# Patient Record
Sex: FEMALE | Race: WHITE | NOT HISPANIC OR LATINO | Employment: FULL TIME | URBAN - METROPOLITAN AREA
[De-identification: names, ages, dates, MRNs, and addresses within clinical notes are randomized per-mention and may not be internally consistent; named-entity substitution may affect disease eponyms.]

---

## 2017-03-19 ENCOUNTER — HOSPITAL ENCOUNTER (EMERGENCY)
Facility: OTHER | Age: 27
Discharge: HOME | End: 2017-03-19
Attending: EMERGENCY MEDICINE
Payer: MEDICAID

## 2017-03-19 VITALS
HEIGHT: 65 IN | HEART RATE: 95 BPM | BODY MASS INDEX: 22.49 KG/M2 | OXYGEN SATURATION: 100 % | WEIGHT: 135 LBS | SYSTOLIC BLOOD PRESSURE: 177 MMHG | TEMPERATURE: 98 F | RESPIRATION RATE: 20 BRPM | DIASTOLIC BLOOD PRESSURE: 120 MMHG

## 2017-03-19 DIAGNOSIS — M43.6 TORTICOLLIS: Primary | ICD-10-CM

## 2017-03-19 PROCEDURE — 99283 EMERGENCY DEPT VISIT LOW MDM: CPT

## 2017-03-19 RX ORDER — INDOMETHACIN 50 MG/1
50 CAPSULE ORAL
Qty: 15 CAPSULE | Refills: 0 | Status: SHIPPED | OUTPATIENT
Start: 2017-03-19 | End: 2017-05-12

## 2017-03-19 RX ORDER — LEVOTHYROXINE SODIUM 100 UG/1
100 TABLET ORAL DAILY
COMMUNITY

## 2017-03-19 RX ORDER — IBUPROFEN 200 MG
400 TABLET ORAL EVERY 6 HOURS PRN
Qty: 30 TABLET | Refills: 0
Start: 2017-03-19 | End: 2020-03-15

## 2017-03-19 RX ORDER — LEVONORGESTREL / ETHINYL ESTRADIOL AND ETHINYL ESTRADIOL 150-30(84)
KIT ORAL
COMMUNITY

## 2017-03-19 RX ORDER — METHOCARBAMOL 750 MG/1
1500 TABLET, FILM COATED ORAL 3 TIMES DAILY
Qty: 30 TABLET | Refills: 0 | Status: SHIPPED | OUTPATIENT
Start: 2017-03-19 | End: 2017-03-29

## 2017-03-19 RX ORDER — ACETAMINOPHEN 500 MG
1000 TABLET ORAL 3 TIMES DAILY PRN
Qty: 30 TABLET | Refills: 0
Start: 2017-03-19 | End: 2020-03-15

## 2017-03-19 NOTE — ED AVS SNAPSHOT
[facility] EMERGENCY DEPARTMENT  [address] [place] [address]               Kaylee Lefort   3/19/2017 10:19 PM   ED    Description:  Female : 1990   Department:  [facility] Emergency Department           Your Care was Coordinated By:     Provider Role From To    Andrea Ovalle MD Attending Provider 17 [phone] --      Reason for Visit     Neck Pain           Diagnoses this Visit        Comments    Torticollis    -  Primary       ED Disposition     ED Disposition Condition Comment    Discharge             To Do List           Follow-up Information     Follow up with [phone]. Schedule an appointment as soon as possible for a visit in 3 day(s).       These Medications        Disp Refills Start End    methocarbamol (ROBAXIN) 750 MG Tab 30 tablet 0 3/19/2017 3/29/2017    Take 2 tablets (1,500 mg total) by mouth 3 (three) times daily. - Oral    ibuprofen (ADVIL,MOTRIN) 200 MG tablet 30 tablet 0 3/19/2017     Take 2 tablets (400 mg total) by mouth every 6 (six) hours as needed for Pain. - Oral    acetaminophen (TYLENOL) 500 MG tablet 30 tablet 0 3/19/2017     Take 2 tablets (1,000 mg total) by mouth 3 (three) times daily as needed for Pain. - Oral    indomethacin (INDOCIN) 50 MG capsule 15 capsule 0 3/19/2017     Take 1 capsule (50 mg total) by mouth 3 (three) times daily with meals. - Oral      [facility]     [facility] -  Assistance  Registered nurses in the [facility] provide clinical advisement, health education, appointment booking, and other advisory services.  Call for this free service at [phone].             Medications           Message regarding Medications     Verify the changes and/or additions to your medication regime listed below are the same as discussed with your clinician today.  If any of these changes or additions are incorrect, please notify your healthcare provider.        START taking these NEW medications  "       Refills    methocarbamol (ROBAXIN) 750 MG Tab 0    Sig: Take 2 tablets (1,500 mg total) by mouth 3 (three) times daily.    Class: Print    Route: Oral    ibuprofen (ADVIL,MOTRIN) 200 MG tablet 0    Sig: Take 2 tablets (400 mg total) by mouth every 6 (six) hours as needed for Pain.    Class: No Print    Route: Oral    acetaminophen (TYLENOL) 500 MG tablet 0    Sig: Take 2 tablets (1,000 mg total) by mouth 3 (three) times daily as needed for Pain.    Class: No Print    Route: Oral    indomethacin (INDOCIN) 50 MG capsule 0    Sig: Take 1 capsule (50 mg total) by mouth 3 (three) times daily with meals.    Class: Print    Route: Oral           Verify that the below list of medications is an accurate representation of the medications you are currently taking.  If none reported, the list may be blank. If incorrect, please contact your healthcare provider. Carry this list with you in case of emergency.           Current Medications     L norgest/e.estradiol-e.estrad (AMETHIA) 0.15 mg-30 mcg (84)/10 mcg (7) 3MPk Take by mouth.    levothyroxine (SYNTHROID) 100 MCG tablet Take 100 mcg by mouth once daily.    acetaminophen (TYLENOL) 500 MG tablet Take 2 tablets (1,000 mg total) by mouth 3 (three) times daily as needed for Pain.    ibuprofen (ADVIL,MOTRIN) 200 MG tablet Take 2 tablets (400 mg total) by mouth every 6 (six) hours as needed for Pain.    indomethacin (INDOCIN) 50 MG capsule Take 1 capsule (50 mg total) by mouth 3 (three) times daily with meals.    methocarbamol (ROBAXIN) 750 MG Tab Take 2 tablets (1,500 mg total) by mouth 3 (three) times daily.           Clinical Reference Information           Your Vitals Were     BP Pulse Temp Resp Height Weight    177/120 (BP Location: Left arm, Patient Position: Sitting) 95 97.9 °F (36.6 °C) (Temporal) 20 5' 5" (1.651 m) 61.2 kg (135 lb)    SpO2 BMI             100% 22.47 kg/m2         Allergies as of 3/19/2017     No Known Allergies      Immunizations Administered on " Date of Encounter - 3/19/2017     None      ED Micro, Lab, POCT     None      ED Imaging Orders     None        Discharge Instructions           Torticollis (Wry Neck)  Torticollis happens when muscles on one side of the neck contract (tighten). This causes the neck to twist or tilt to the side. The muscles may also be quite sore. It affects mainly children and young adults, often appearing overnight. It can also affect infants who develop or are born with tight neck muscles on one side.  What causes torticollis?  Causes of torticollis include:  · Congenital (present at birth). Injury to the neck muscles from an accident or other trauma, or even just sleeping in an unusual position  · Side effect of certain medicines or drugs  · Problems with the bones of the neck (which can happen after an infection or injury)  · Spasm of the muscles due to an infection, such as an abscess in the neck  When to go to the emergency room (ER)  All neck problems should be checked by a healthcare provider within 24 hours. Seek emergency care if you can't reach your healthcare provider or these symptoms are present:  · Trouble breathing or swallowing or in smaller children, continuous drooling  · Numbness or weakness in the arms and legs  · Trouble walking or speaking  · Fever  What to expect in the ER  The neck will be examined, and questions about any current or former medical problems will be asked. X-rays of the neck may be taken to check for broken bones.  Treatment  The goal in treating torticollis is to relax the neck muscles. The best approach will depend on the cause of the problem. In most cases, one or more of the following may be given:  · Medicines to help relax the muscles and reduce swelling  · Hot and cold compresses to help ease muscle tightness  · Botulinum toxin injections to prevent further muscle spasms  · Physical therapy to help stretch and relax the muscles  · Treatment of any infection, which may need intravenous  antibiotics or surgery  Follow-up  Depending on the cause, torticollis often goes away on its own. Follow up with your healthcare provider as instructed. If symptoms become worse, call your healthcare provider or return to the ER.  Date Last Reviewed: 9/30/2015  © 9292-2316 [employer]. [address] [place] [address]. All rights reserved. This information is not intended as a substitute for professional medical care. Always follow your healthcare professional's instructions.          [facility] Sign-Up     Activating your [facility] account is as easy as 1-2-3!     1) Visit [email or web address], select Sign Up Now, enter this activation code and your date of birth, then select Next.  [email or web address]  Expires: 5/3/2017 10:39 PM      2) Create a username and password to use when you visit [facility] in the future and select a security question in case you lose your password and select Next.    3) Enter your e-mail address and click Sign Up!    Additional Information  If you have questions, please e-mail [email or web address] or call [phone] to talk to our [facility] staff. Remember, [facility] is NOT to be used for urgent needs. For medical emergencies, dial 911.          [facility] complies with applicable Federal civil rights laws and does not discriminate on the basis of race, color, national origin, age, disability, or sex.        Language Assistance Services     ATTENTION: Language assistance services are available, free of charge. Please call [phone].      ATENCIÓN: Si habla español, tiene a chaparro disposición servicios gratuitos de asistencia lingüística. Llame al [phone].     CHÚ Ý: N?u b?n nói Ti?ng Vi?t, có các d?ch v? h? tr? ngôn ng? mi?n phí dành cho b?n. G?i s? [phone].

## 2017-03-20 NOTE — ED PROVIDER NOTES
Encounter Date: 3/19/2017       History     Chief Complaint   Patient presents with    Neck Pain     pt presents to ER with c/o rt sided neck pain and stiffness.      Review of patient's allergies indicates:  No Known Allergies  Patient is a 26 y.o. female presenting with the following complaint: neck pain. The history is provided by the patient.   Neck Pain    This is a new problem. The current episode started today. The problem has been unchanged. The pain is associated with nothing. The pain is present in the occipital region (Pinpoint pain and spasm on the right-hand side). The pain is at a severity of 5/10. The symptoms are aggravated by twisting. Pertinent negatives include no photophobia, no visual change, no paresis and no weakness. She has tried NSAIDs and heat for the symptoms.     Past Medical History:   Diagnosis Date    Thyroid disease      Past Surgical History:   Procedure Laterality Date    BACK SURGERY       History reviewed. No pertinent family history.  Social History   Substance Use Topics    Smoking status: Never Smoker    Smokeless tobacco: None    Alcohol use No     Review of Systems   Constitutional: Negative.    HENT: Negative.    Eyes: Negative.  Negative for photophobia.   Respiratory: Negative.    Cardiovascular: Negative.    Gastrointestinal: Negative.    Endocrine: Negative.    Genitourinary: Negative.    Musculoskeletal: Positive for neck pain.   Skin: Negative.    Allergic/Immunologic: Negative.    Neurological: Negative.  Negative for weakness.   Hematological: Negative.    Psychiatric/Behavioral: Negative.    All other systems reviewed and are negative.      Physical Exam   Initial Vitals   BP Pulse Resp Temp SpO2   03/19/17 2216 03/19/17 2216 03/19/17 2216 03/19/17 2216 03/19/17 2216   177/120 95 20 97.9 °F (36.6 °C) 100 %     Physical Exam    Nursing note and vitals reviewed.  Constitutional: Vital signs are normal. She appears well-developed. She is active and cooperative.    HENT:   Head: Normocephalic and atraumatic.   Eyes: Conjunctivae, EOM and lids are normal. Pupils are equal, round, and reactive to light.   Neck: Trachea normal and full passive range of motion without pain. Neck supple. No thyroid mass present. Muscular tenderness present. No spinous process tenderness present. No rigidity. No Brudzinski's sign and no Kernig's sign noted.       Cardiovascular: Normal rate, regular rhythm, S1 normal, S2 normal, normal heart sounds, intact distal pulses and normal pulses.   Abdominal: Soft. Normal appearance, normal aorta and bowel sounds are normal.   Musculoskeletal: Normal range of motion.   Lymphadenopathy:     She has no axillary adenopathy.   Neurological: She is alert and oriented to person, place, and time.   Skin: Skin is warm, dry and intact.   Psychiatric: She has a normal mood and affect. Her speech is normal and behavior is normal. Judgment and thought content normal. Cognition and memory are normal.         ED Course   Procedures  Labs Reviewed - No data to display                            ED Course     Clinical Impression:   The encounter diagnosis was Torticollis.          Andrea Ovalle MD  03/19/17 [id number]

## 2017-03-20 NOTE — ED TRIAGE NOTES
Pt presents to ER with c/o neck stiffness to rt side of neck that started yesterday,  Has been taking tylenol without relief.

## 2017-03-20 NOTE — DISCHARGE INSTRUCTIONS
Torticollis (Wry Neck)  Torticollis happens when muscles on one side of the neck contract (tighten). This causes the neck to twist or tilt to the side. The muscles may also be quite sore. It affects mainly children and young adults, often appearing overnight. It can also affect infants who develop or are born with tight neck muscles on one side.  What causes torticollis?  Causes of torticollis include:  · Congenital (present at birth). Injury to the neck muscles from an accident or other trauma, or even just sleeping in an unusual position  · Side effect of certain medicines or drugs  · Problems with the bones of the neck (which can happen after an infection or injury)  · Spasm of the muscles due to an infection, such as an abscess in the neck  When to go to the emergency room (ER)  All neck problems should be checked by a healthcare provider within 24 hours. Seek emergency care if you can't reach your healthcare provider or these symptoms are present:  · Trouble breathing or swallowing or in smaller children, continuous drooling  · Numbness or weakness in the arms and legs  · Trouble walking or speaking  · Fever  What to expect in the ER  The neck will be examined, and questions about any current or former medical problems will be asked. X-rays of the neck may be taken to check for broken bones.  Treatment  The goal in treating torticollis is to relax the neck muscles. The best approach will depend on the cause of the problem. In most cases, one or more of the following may be given:  · Medicines to help relax the muscles and reduce swelling  · Hot and cold compresses to help ease muscle tightness  · Botulinum toxin injections to prevent further muscle spasms  · Physical therapy to help stretch and relax the muscles  · Treatment of any infection, which may need intravenous antibiotics or surgery  Follow-up  Depending on the cause, torticollis often goes away on its own. Follow up with your healthcare provider  as instructed. If symptoms become worse, call your healthcare provider or return to the ER.  Date Last Reviewed: 9/30/2015  © 0871-9668 [employer]. [address] [place] [address], [place] [address]. All rights reserved. This information is not intended as a substitute for professional medical care. Always follow your healthcare professional's instructions.

## 2017-05-09 ENCOUNTER — HOSPITAL ENCOUNTER (EMERGENCY)
Facility: OTHER | Age: 27
Discharge: HOME | End: 2017-05-09
Attending: EMERGENCY MEDICINE
Payer: MEDICAID

## 2017-05-09 VITALS
HEART RATE: 98 BPM | WEIGHT: 130 LBS | BODY MASS INDEX: 21.63 KG/M2 | OXYGEN SATURATION: 99 % | TEMPERATURE: 98 F | SYSTOLIC BLOOD PRESSURE: 152 MMHG | RESPIRATION RATE: 18 BRPM | DIASTOLIC BLOOD PRESSURE: 94 MMHG

## 2017-05-09 DIAGNOSIS — L03.90 CELLULITIS, UNSPECIFIED CELLULITIS SITE: Primary | ICD-10-CM

## 2017-05-09 PROCEDURE — 96372 THER/PROPH/DIAG INJ SC/IM: CPT

## 2017-05-09 PROCEDURE — 25000003 PHARM REV CODE 250: Performed by: EMERGENCY MEDICINE

## 2017-05-09 PROCEDURE — 99283 EMERGENCY DEPT VISIT LOW MDM: CPT

## 2017-05-09 RX ORDER — LORATADINE 10 MG/1
10 TABLET ORAL DAILY
Qty: 60 TABLET | Refills: 0 | Status: SHIPPED | OUTPATIENT
Start: 2017-05-09 | End: 2018-05-09

## 2017-05-09 RX ORDER — AMLODIPINE BESYLATE 5 MG/1
5 TABLET ORAL DAILY
COMMUNITY

## 2017-05-09 RX ORDER — CLINDAMYCIN PHOSPHATE 150 MG/ML
600 INJECTION, SOLUTION INTRAVENOUS
Status: COMPLETED | OUTPATIENT
Start: 2017-05-09 | End: 2017-05-09

## 2017-05-09 RX ORDER — DIPHENHYDRAMINE HCL 25 MG
25 CAPSULE ORAL EVERY 6 HOURS PRN
Qty: 30 CAPSULE | Refills: 0 | Status: SHIPPED | OUTPATIENT
Start: 2017-05-09 | End: 2017-05-17

## 2017-05-09 RX ORDER — CLINDAMYCIN HYDROCHLORIDE 150 MG/1
300 CAPSULE ORAL 4 TIMES DAILY
Qty: 56 CAPSULE | Refills: 0 | Status: SHIPPED | OUTPATIENT
Start: 2017-05-09 | End: 2017-05-17

## 2017-05-09 RX ORDER — NAPROXEN 375 MG/1
375 TABLET ORAL 2 TIMES DAILY PRN
Qty: 20 TABLET | Refills: 0 | Status: SHIPPED | OUTPATIENT
Start: 2017-05-09 | End: 2017-05-12

## 2017-05-09 RX ADMIN — CLINDAMYCIN PHOSPHATE 600 MG: 150 INJECTION, SOLUTION INTRAMUSCULAR; INTRAVENOUS at 07:05

## 2017-05-10 NOTE — ED PROVIDER NOTES
Encounter Date: 5/9/2017       History     Chief Complaint   Patient presents with    Ankle Pain     Pt c/o of l ankle swelling x 3 days. Pt denies trauma. Swelling and redness noted.      Review of patient's allergies indicates:  No Known Allergies  HPI Comments: Kaylee Lefort is a 26 y.o. female who presents to the Emergency Department with  redness pain and swelling to bilateral lower extremity.  Patient states 3 days ago she was bit by mosquitoes and then noticed red painful bumps which is gotten slowly worse over last few days now she has swelling around the red spots on her lower legs.  Patient denies any injury.  Patient's been trying steroid cream but has not helped.  Patient's taken nothing for pain.    Patient is a 26 y.o. female presenting with the following complaint: rash. The history is provided by the patient.   Rash    This is a new problem. The current episode started several days ago. The problem has been gradually worsening. The problem is associated with an insect bite/sting. Affected Location: Left lower leg and ankle and Right lower leg. The pain is at a severity of 3/10. The pain has been intermittent since onset. Associated symptoms include itching and pain. Pertinent negatives include no blisters and no weeping. She has tried anti-itch cream for the symptoms. The treatment provided mild relief.     Past Medical History:   Diagnosis Date    Hypertension     Thyroid disease      Past Surgical History:   Procedure Laterality Date    BACK SURGERY       History reviewed. No pertinent family history.  Social History   Substance Use Topics    Smoking status: Never Smoker    Smokeless tobacco: None    Alcohol use No     Review of Systems   Constitutional: Negative for fever.   HENT: Negative for sore throat.    Respiratory: Negative for shortness of breath.    Cardiovascular: Negative for chest pain.   Gastrointestinal: Negative for nausea.   Genitourinary: Negative for dysuria.    Musculoskeletal: Negative for back pain, gait problem and neck pain.   Skin: Positive for itching and rash.   Neurological: Negative for weakness.   Hematological: Does not bruise/bleed easily.   All other systems reviewed and are negative.      Physical Exam   Initial Vitals   BP Pulse Resp Temp SpO2   05/09/17 1826 05/09/17 1826 05/09/17 1826 05/09/17 1826 05/09/17 1826   152/94 98 18 98 °F (36.7 °C) 99 %     Physical Exam    Nursing note and vitals reviewed.  Constitutional: She appears well-developed and well-nourished. She is not diaphoretic. No distress.   HENT:   Head: Normocephalic and atraumatic.   Right Ear: External ear normal.   Left Ear: External ear normal.   Nose: Nose normal.   Mouth/Throat: Oropharynx is clear and moist.   Eyes: Conjunctivae and EOM are normal. Pupils are equal, round, and reactive to light.   Neck: Normal range of motion. Neck supple.   Cardiovascular: Normal rate, regular rhythm, normal heart sounds and intact distal pulses. Exam reveals no gallop and no friction rub.    No murmur heard.  Pulmonary/Chest: Breath sounds normal. No respiratory distress. She has no wheezes. She has no rhonchi. She has no rales.   Abdominal: Soft. There is no tenderness. There is no rebound and no guarding.   Musculoskeletal: Normal range of motion. She exhibits edema and tenderness.        Right hip: Normal.        Left hip: Normal.        Right knee: Normal.        Left knee: Normal.        Right ankle: She exhibits swelling. She exhibits normal range of motion, no deformity, no laceration and normal pulse. Tenderness.        Left ankle: She exhibits swelling. She exhibits normal range of motion, no deformity, no laceration and normal pulse. Tenderness.        Right lower leg: Normal.        Left lower leg: Normal.        Left foot: There is tenderness and swelling. There is normal range of motion, normal capillary refill, no crepitus and no laceration.        Feet:    Irregular patches of  erythema appreciated to bilateral lower extremities.  Tenderness in area of lesions.  Negative Nikolsky sign.  Please refer to photos   Neurological: She is alert and oriented to person, place, and time. She has normal strength and normal reflexes.   Skin: Skin is warm and dry. Petechiae and rash noted. No abscess noted. Rash is not vesicular and not urticarial. There is erythema.        Negative Nikolsky sign.  Areas of erythema with Irregular borders.  Please refer to photos   Psychiatric: She has a normal mood and affect.                         ED Course   Procedures  Labs Reviewed - No data to display                            ED Course     MDM  CC mosquito bites that are now painful and swollen.  DDx ALLERGIC reaction, contact dermatitis, and cellulitis  Treatment in the ED is occluded exam.  Patient is nontoxic in appearance.  Tenderness at sites of erythema.  Discussed prescriptions and need to follow-up in 2 days for wound check either with primary care or ED.  Also provided patient with a dermatology referral.  Advised patient to return  to the emergency symptoms worsen or do not resolve  Fill and take prescriptions as directed.  Answered questions and discussed discharge plan.    Follow up with PCP in 1-2 days.    Clinical Impression:   The encounter diagnosis was Cellulitis, unspecified cellulitis site.           Naila Toscano DO  05/09/17 1946

## 2017-05-10 NOTE — DISCHARGE INSTRUCTIONS
Cellulitis  Cellulitis is an infection of the deep layers of skin. A break in the skin, such as a cut or scratch, can let bacteria under the skin. If the bacteria get to deep layers of the skin, it can be serious. If not treated, cellulitis can get into the bloodstream and lymph nodes. The infection can then spread throughout the body. This causes serious illness.  Cellulitis causes the affected skin to become red, swollen, warm, and sore. The reddened areas have a visible border. An open sore may leak fluid (pus). You may have a fever, chills, and pain.  Cellulitis is treated with antibiotics taken for 7 to 10 days. An open sore may be cleaned and covered with cool wet gauze. Symptoms should get better 1 to 2 days after treatment is started. Make sure to take all the antibiotics for the full number of days until they are gone. Keep taking the medicine even if your symptoms go away.  Home care  Follow these tips:  · Limit the use of the part of your body with cellulitis.   · If the infection is on your leg, keep your leg raised while sitting. This will help to reduce swelling.  · Take all of the antibiotic medicine exactly as directed until it is gone. Do not miss any doses, especially during the first 7 days. Dont stop taking the medicine when your symptoms get better.  · Keep the affected area clean and dry.  · Wash your hands with soap and warm water before and after touching your skin. Anyone else who touches your skin should also wash his or her hands. Don't share towels.  Follow-up care  Follow up with your healthcare provider, or as advised. If your infection does not go away on the first antibiotic, your healthcare provider will prescribe a different one.  When to seek medical advice  Call your healthcare provider right away if any of these occur:  · Red areas that spread  · Swelling or pain that gets worse  · Fluid leaking from the skin (pus)  · Fever higher of 100.4º F (38.0º C) or higher after 2 days  on antibiotics  Date Last Reviewed: 9/1/2016  © 7668-2216 [employer]. [address] [place] [address], [place] [address]. All rights reserved. This information is not intended as a substitute for professional medical care. Always follow your healthcare professional's instructions.

## 2017-05-12 ENCOUNTER — HOSPITAL ENCOUNTER (EMERGENCY)
Facility: OTHER | Age: 27
Discharge: ANOTHER HOSPITAL | End: 2017-05-12
Attending: EMERGENCY MEDICINE
Payer: MEDICAID

## 2017-05-12 ENCOUNTER — HOSPITAL ENCOUNTER (EMERGENCY)
Facility: HOSPITAL | Age: 27
Discharge: HOME | End: 2017-05-12
Attending: EMERGENCY MEDICINE
Payer: MEDICAID

## 2017-05-12 VITALS
HEART RATE: 96 BPM | BODY MASS INDEX: 21.66 KG/M2 | DIASTOLIC BLOOD PRESSURE: 92 MMHG | TEMPERATURE: 99 F | RESPIRATION RATE: 18 BRPM | SYSTOLIC BLOOD PRESSURE: 156 MMHG | WEIGHT: 130 LBS | HEIGHT: 65 IN | OXYGEN SATURATION: 98 %

## 2017-05-12 VITALS
BODY MASS INDEX: 21.66 KG/M2 | HEIGHT: 65 IN | DIASTOLIC BLOOD PRESSURE: 74 MMHG | SYSTOLIC BLOOD PRESSURE: 118 MMHG | RESPIRATION RATE: 18 BRPM | HEART RATE: 76 BPM | OXYGEN SATURATION: 100 % | WEIGHT: 130 LBS | TEMPERATURE: 98 F

## 2017-05-12 DIAGNOSIS — R23.3 PETECHIAL RASH: Primary | ICD-10-CM

## 2017-05-12 DIAGNOSIS — M79.89 LEFT LEG SWELLING: ICD-10-CM

## 2017-05-12 DIAGNOSIS — M79.89 PAIN AND SWELLING OF LEFT LOWER LEG: Primary | ICD-10-CM

## 2017-05-12 DIAGNOSIS — M79.662 PAIN AND SWELLING OF LEFT LOWER LEG: Primary | ICD-10-CM

## 2017-05-12 DIAGNOSIS — L03.90 CELLULITIS, UNSPECIFIED CELLULITIS SITE: ICD-10-CM

## 2017-05-12 LAB
ALBUMIN SERPL-MCNC: 3.2 G/DL (ref 3.3–5.5)
ALP SERPL-CCNC: 49 U/L (ref 42–141)
APTT BLDCRRT: 27.8 SEC
B-HCG UR QL: NEGATIVE
BASOPHILS # BLD AUTO: 0.03 K/UL
BASOPHILS NFR BLD: 0.8 %
BILIRUB SERPL-MCNC: 0.5 MG/DL (ref 0.2–1.6)
BUN SERPL-MCNC: 11 MG/DL (ref 7–22)
CALCIUM SERPL-MCNC: 9.9 MG/DL (ref 8–10.3)
CHLORIDE SERPL-SCNC: 104 MMOL/L (ref 98–108)
CREAT SERPL-MCNC: 0.7 MG/DL (ref 0.6–1.2)
CTP QC/QA: YES
DIFFERENTIAL METHOD: NORMAL
EOSINOPHIL # BLD AUTO: 0.1 K/UL
EOSINOPHIL NFR BLD: 1.5 %
ERYTHROCYTE [DISTWIDTH] IN BLOOD BY AUTOMATED COUNT: 14.5 %
GLUCOSE SERPL-MCNC: 89 MG/DL (ref 73–118)
HCT VFR BLD AUTO: 38.8 %
HGB BLD-MCNC: 13 G/DL
INR PPP: 1
LYMPHOCYTES # BLD AUTO: 1.4 K/UL
LYMPHOCYTES NFR BLD: 35.1 %
MCH RBC QN AUTO: 30.2 PG
MCHC RBC AUTO-ENTMCNC: 33.5 %
MCV RBC AUTO: 90 FL
MONOCYTES # BLD AUTO: 0.4 K/UL
MONOCYTES NFR BLD: 8.8 %
NEUTROPHILS # BLD AUTO: 2.2 K/UL
NEUTROPHILS NFR BLD: 53.8 %
PLATELET # BLD AUTO: 301 K/UL
PMV BLD AUTO: 10 FL
POC ALT (SGPT): 21 U/L (ref 10–47)
POC AST (SGOT): 24 U/L (ref 11–38)
POC PTINR: 0.9 (ref 0.9–1.2)
POC PTWBT: 11.3 SEC (ref 9.7–14.3)
POC TCO2: 23 MMOL/L (ref 18–33)
POTASSIUM BLD-SCNC: 3.6 MMOL/L (ref 3.6–5.1)
PROTEIN, POC: 7.3 G/DL (ref 6.4–8.1)
PROTHROMBIN TIME: 10.1 SEC
RBC # BLD AUTO: 4.3 M/UL
SAMPLE: NORMAL
SODIUM BLD-SCNC: 142 MMOL/L (ref 128–145)
WBC # BLD AUTO: 3.99 K/UL

## 2017-05-12 PROCEDURE — 25000003 PHARM REV CODE 250: Performed by: EMERGENCY MEDICINE

## 2017-05-12 PROCEDURE — 96365 THER/PROPH/DIAG IV INF INIT: CPT

## 2017-05-12 PROCEDURE — 85730 THROMBOPLASTIN TIME PARTIAL: CPT

## 2017-05-12 PROCEDURE — 81025 URINE PREGNANCY TEST: CPT | Performed by: EMERGENCY MEDICINE

## 2017-05-12 PROCEDURE — 85610 PROTHROMBIN TIME: CPT

## 2017-05-12 PROCEDURE — 96366 THER/PROPH/DIAG IV INF ADDON: CPT

## 2017-05-12 PROCEDURE — 99284 EMERGENCY DEPT VISIT MOD MDM: CPT | Mod: 25,27

## 2017-05-12 PROCEDURE — 96375 TX/PRO/DX INJ NEW DRUG ADDON: CPT

## 2017-05-12 PROCEDURE — 85025 COMPLETE CBC W/AUTO DIFF WBC: CPT

## 2017-05-12 PROCEDURE — 99284 EMERGENCY DEPT VISIT MOD MDM: CPT | Mod: 25

## 2017-05-12 PROCEDURE — 63600175 PHARM REV CODE 636 W HCPCS: Performed by: EMERGENCY MEDICINE

## 2017-05-12 RX ORDER — CLINDAMYCIN PHOSPHATE 900 MG/50ML
900 INJECTION, SOLUTION INTRAVENOUS
Status: COMPLETED | OUTPATIENT
Start: 2017-05-12 | End: 2017-05-12

## 2017-05-12 RX ORDER — KETOROLAC TROMETHAMINE 30 MG/ML
15 INJECTION, SOLUTION INTRAMUSCULAR; INTRAVENOUS
Status: COMPLETED | OUTPATIENT
Start: 2017-05-12 | End: 2017-05-12

## 2017-05-12 RX ADMIN — CLINDAMYCIN PHOSPHATE 900 MG: 900 INJECTION INTRAVENOUS at 01:05

## 2017-05-12 RX ADMIN — KETOROLAC TROMETHAMINE 15 MG: 30 INJECTION, SOLUTION INTRAMUSCULAR at 01:05

## 2017-05-12 NOTE — ED NOTES
Pt identifiers checked and correct.    LOC: The patient is awake, alert and aware of environment with an appropriate affect, the patient is oriented x 3 and speaking appropriately.   APPEARANCE: Patient resting comfortably and in no acute distress, patient is clean and well groomed, patient's clothing is properly fastened.   SKIN: The skin is warm and dry, color consistent with ethnicity, patient has normal skin turgor and moist mucus membranes, skin intact, cellulitic to bi-lat LE   MUSCULOSKELETAL: Patient moving all extremities spontaneously, no obvious swelling or deformities noted.   RESPIRATORY: Airway is open and patent, respirations are spontaneous, patient has a normal effort and rate, no accessory muscle use noted.   CARDIAC: Patient has a normal rate and regular rhythm, no periphreal edema noted, capillary refill < 3 seconds.   ABDOMEN: Soft and non tender to palpation, no distention noted, active bowel sounds present in all four quadrants.   NEUROLOGIC: PERRL, 3 mm bilaterally, eyes open spontaneously, behavior appropriate to situation, follows commands, facial expression symmetrical, bilateral hand grasp equal and even, purposeful motor response noted, normal sensation in all extremities when touched with a finger.

## 2017-05-12 NOTE — ED NOTES
Measurments: in cm                  Left                            Right                                            Ankle      25                               22                                              Calf      34                               34                                            Knee      35 1/2                         35

## 2017-05-12 NOTE — ED NOTES
Pt to be transferred to [facility] via POV / pt A.[facility]A. For EMS of own choice and of own free will / pt cleared to go to [facility] by Dr Toscano / IV removed and pt AAO w/ NAD denies CP and or SOB     Pt to go to ED and see Dr Thomas [phone]

## 2017-05-12 NOTE — DISCHARGE INSTRUCTIONS
Make appointment with primary care physician for follow-up care and further evaluation of swelling and rash.  You may also make appointment with dermatologist provided by previous provider for further evaluation a rash.

## 2017-05-12 NOTE — ED PROVIDER NOTES
Encounter Date: 5/12/2017       History     Chief Complaint   Patient presents with    Leg Swelling     3 days ago diagnosed with cellulitis, LLE     Review of patient's allergies indicates:  No Known Allergies  HPI Comments: CC: Leg Swelling    HPI: 26 y.o. F with a PMHx of HTN and thyroid disease presents to the ED c/o acute onset of L lower leg swelling beginning 6 days ago. Pt was sent here today from clinic for blood clot evaluation. Pt has mild pain with palpation, weight-bearing, and movement of foot. Leg swelling decreases with elevation but returns when the pt is back on her feet. No recent long distance travel or trauma. Pt also has an itchy, bumpy rash to bilateral lower legs with the swelling. Itching and pain has gotten better since treatment on prior ED visit. Pt denies fever, thigh pain, CP, SOB, and any other symptoms.    The history is provided by the patient.     Past Medical History:   Diagnosis Date    Hypertension     Thyroid disease      Past Surgical History:   Procedure Laterality Date    ABDOMINAL SURGERY      BACK SURGERY       History reviewed. No pertinent family history.  Social History   Substance Use Topics    Smoking status: Never Smoker    Smokeless tobacco: None    Alcohol use No     Review of Systems   Constitutional: Negative for chills and fever.   HENT: Negative for congestion and sore throat.    Eyes: Negative for pain.   Respiratory: Negative for cough and shortness of breath.    Cardiovascular: Positive for leg swelling (L leg). Negative for chest pain.   Gastrointestinal: Negative for abdominal pain, nausea and vomiting.   Genitourinary: Negative for dysuria.   Musculoskeletal: Negative for back pain and neck pain.   Skin: Positive for rash (bialteral lower extremities).   Neurological: Negative for headaches.       Physical Exam   Initial Vitals   BP Pulse Resp Temp SpO2   05/12/17 1607 05/12/17 1607 05/12/17 1607 05/12/17 1607 05/12/17 1607   112/61 81 16 98.3 °F  (36.8 °C) 100 %     Physical Exam    Vitals reviewed.  Constitutional: She appears well-developed and well-nourished. She is not diaphoretic. No distress.   HENT:   Head: Normocephalic and atraumatic.   Right Ear: External ear normal.   Left Ear: External ear normal.   Nose: Nose normal.   Mouth/Throat: No oropharyngeal exudate.   Eyes: Conjunctivae are normal. No scleral icterus.   Neck: Normal range of motion. Neck supple.   Cardiovascular: Normal rate, regular rhythm, normal heart sounds and intact distal pulses.   Pulmonary/Chest: Breath sounds normal. No respiratory distress. She has no wheezes. She has no rhonchi. She has no rales. She exhibits no tenderness.   Abdominal: Soft. She exhibits no distension and no mass. There is no tenderness. There is no rebound and no guarding.   Musculoskeletal: Normal range of motion. She exhibits edema (Left leg edema extending from the ankle to the middle of the leg circumferentially.) and tenderness (mild tenderness at the left ankle).   Lymphadenopathy:     She has no cervical adenopathy.   Neurological: She is alert and oriented to person, place, and time.   Skin: Skin is warm and dry. Rash (Petechial rash to bilateral lower legs) noted. No erythema.         ED Course   Procedures  Labs Reviewed   CBC W/ AUTO DIFFERENTIAL   APTT   PROTIME-INR   POCT URINE PREGNANCY             Medical Decision Making:   History:   Old Medical Records: I decided to obtain old medical records.    26-year-old female with no significant medical history presents with left leg edema and rash to bilateral lower legs that started 6 days ago.  Left leg swelling was much greater 2 days ago and has improved.  She was seen at stand alone ER and was sent here for ultrasound.  Patient denies fever, arthralgias, shortness of breath, chest pain, abdominal pain or nausea.  She presents in no distress with vitals within normal limits.  She has mild diffuse edema of the left leg with petechiae to bilateral  lower legs.  There is mild tenderness of the ankle without warmth or erythema.  Bilateral DP pulses present and equal.  Abdomen soft and nontender.  Labs obtained show no thrombocytopenia, and normal coags.  No leukocytosis or anemia.  Ultrasound shows no DVT.  I do not suspect septic arthritis or serious infection, or meningococcemia.  This is not DIC.  I doubt HSP, although this may be some type of vasculitis.  Patient stable for discharge and outpatient workup.  Patient advised to follow-up with PCP as well as dermatology that was provided by the previous provider.  Patient verbalized understanding and agreed with plan.  Case discussed with Dr. Lopez.          Scribe Attestation:   Scribe #1: I performed the above scribed service and the documentation accurately describes the services I performed. I attest to the accuracy of the note.    Attending Attestation:     Physician Attestation Statement for NP/PA:   I discussed this assessment and plan of this patient with the NP/PA, but I did not personally examine the patient. The face to face encounter was performed by the NP/PA.        Physician Attestation for Scribe:  Physician Attestation Statement for Scribe #1: I, Yang Matt PA-C, reviewed documentation, as scribed by June Torres in my presence, and it is both accurate and complete.                 ED Course     Clinical Impression:   The primary encounter diagnosis was Petechial rash. A diagnosis of Left leg swelling was also pertinent to this visit.          SONYA DukeC  05/12/17 1921       Omar Lopez MD  05/17/17 0632

## 2017-05-12 NOTE — ED AVS SNAPSHOT
[facility] EMERGENCY DEPARTMENT  [address] [place] [address]               Kaylee Lefort   2017 12:38 PM   ED    Description:  Female : 1990   Department:  [facility] Emergency Department           Your Care was Coordinated By:     Provider Role From To    Naila Toscano DO Attending Provider 17 1238 --      Reason for Visit     Recheck     Cellulitis           Diagnoses this Visit        Comments    Pain and swelling of left lower leg    -  Primary     Cellulitis, unspecified cellulitis site     Bilateral lower extremity cellulitis      ED Disposition     ED Disposition Condition Comment    Transfer to Another Facility  Kaylee Lefort should be transferred out to [facility].           To Do List           [facility]     [facility] Nurse Care Line -  Assistance  Unless otherwise directed by your provider, please contact [facility], our nurse care line that is available for  assistance.     Registered nurses in the [facility] provide: appointment scheduling, clinical advisement, health education, and other advisory services.  Call: [phone] (toll free)               Medications           Message regarding Medications     Verify the changes and/or additions to your medication regime listed below are the same as discussed with your clinician today.  If any of these changes or additions are incorrect, please notify your healthcare provider.        These medications were administered today        Dose Freq    clindamycin 900 MG/50 ML D5W 900 mg/50 mL IVPB 900 mg 900 mg ED 1 Time    Sig: Inject 50 mLs (900 mg total) into the vein ED 1 Time.    Class: Normal    Route: Intravenous    ketorolac injection 15 mg 15 mg ED 1 Time    Sig: Inject 15 mg into the vein ED 1 Time.    Class: Normal    Route: Intravenous      STOP taking these medications     indomethacin (INDOCIN) 50 MG capsule Take 1 capsule (50 mg total) by mouth 3 (three) times  "daily with meals.    naproxen (EC-NAPROSYN) 375 MG TbEC EC tablet Take 1 tablet (375 mg total) by mouth 2 (two) times daily as needed. Take with food 2 times a day.           Verify that the below list of medications is an accurate representation of the medications you are currently taking.  If none reported, the list may be blank. If incorrect, please contact your healthcare provider. Carry this list with you in case of emergency.           Current Medications     amlodipine (NORVASC) 5 MG tablet Take 5 mg by mouth once daily.    clindamycin (CLEOCIN) 150 MG capsule Take 2 capsules (300 mg total) by mouth 4 (four) times daily.    diphenhydrAMINE (BENADRYL) 25 mg capsule Take 1 each (25 mg total) by mouth every 6 (six) hours as needed for Itching or Allergies.    L norgest/e.estradiol-e.estrad (AMETHIA) 0.15 mg-30 mcg (84)/10 mcg (7) 3MPk Take by mouth.    levothyroxine (SYNTHROID) 100 MCG tablet Take 100 mcg by mouth once daily.    loratadine (CLARITIN) 10 mg tablet Take 1 tablet (10 mg total) by mouth once daily.    acetaminophen (TYLENOL) 500 MG tablet Take 2 tablets (1,000 mg total) by mouth 3 (three) times daily as needed for Pain.    ibuprofen (ADVIL,MOTRIN) 200 MG tablet Take 2 tablets (400 mg total) by mouth every 6 (six) hours as needed for Pain.           Clinical Reference Information           Your Vitals Were     BP Pulse Temp Resp Height Weight    156/92 (BP Location: Left arm, Patient Position: Sitting) 96 98.9 °F (37.2 °C) (Oral) 18 5' 5" (1.651 m) 59 kg (130 lb)    SpO2 BMI             98% 21.63 kg/m2         Allergies as of 5/12/2017     No Known Allergies      Immunizations Administered on Date of Encounter - 5/12/2017     None      ED Micro, Lab, POCT     Start Ordered       Status Ordering Provider    05/12/17 1333 05/12/17 1333  ISTAT PROCEDURE  Once      Final result     05/12/17 1324 05/12/17 1324  POCT CMP  Once      Final result     05/12/17 1257 05/12/17 1257  POCT Protime-INR  Once      " Completed     05/12/17 1257 05/12/17 1257  POCT CBC  Once      Final result     05/12/17 1257 05/12/17 1257  POCT CMP  Once      Completed       ED Imaging Orders     None      [facility] Sign-Up     Activating your [facility] account is as easy as 1-2-3!     1) Visit [email or web address], select Sign Up Now, enter this activation code and your date of birth, then select Next.  [email or web address]  Expires: 6/23/2017  7:21 PM      2) Create a username and password to use when you visit [facility] in the future and select a security question in case you lose your password and select Next.    3) Enter your e-mail address and click Sign Up!    Additional Information  If you have questions, please e-mail [email or web address] or call [phone] to talk to our [facility] staff. Remember, [facility] is NOT to be used for urgent needs. For medical emergencies, dial 911.          [facility] Emergency Department complies with applicable Federal civil rights laws and does not discriminate on the basis of race, color, national origin, age, disability, or sex.        Language Assistance Services     ATTENTION: Language assistance services are available, free of charge. Please call [phone].      ATENCIÓN: Si habla español, tiene a chaparro disposición servicios gratuitos de asistencia lingüística. Llame al [phone].     CHÚ Ý: N?u b?n nói Ti?ng Vi?t, có các d?ch v? h? tr? ngôn ng? mi?n phí dành cho b?n. G?i s? [phone].

## 2017-05-12 NOTE — ED AVS SNAPSHOT
[facility]  [place] [address]               Kaylee Lefort   2017  4:16 PM   ED    Description:  Female : 1990   Department:  [facility]           Your Care was Coordinated By:     Provider Role From To    Omar Lopez MD Attending Provider 17 3939 --    Yang Matt PA-C Physician Assistant 17 6577 --      Reason for Visit     Leg Swelling           Diagnoses this Visit        Comments    Petechial rash    -  Primary     Left leg swelling           ED Disposition     None           To Do List           Follow-up Information     Go to [facility].    Specialty:  Emergency Medicine    Why:  If symptoms worsen    Contact information:    [place] [id number] [phone]        Schedule an appointment as soon as possible for a visit with SELVIN Rosa.    Specialty:  Family Medicine    Why:  For further evaluation    Contact information:    [address]  [place] [address]  [phone]        [facility]     [facility] Care Line -  Assistance  Unless otherwise directed by your provider, please contact [facility], our nurse care line that is available for  assistance.     Registered nurses in the [facility] provide: appointment scheduling, clinical advisement, health education, and other advisory services.  Call: [phone] (toll free)               Medications           Message regarding Medications     Verify the changes and/or additions to your medication regime listed below are the same as discussed with your clinician today.  If any of these changes or additions are incorrect, please notify your healthcare provider.             Verify that the below list of medications is an accurate representation of the medications you are currently taking.  If none reported, the list may be blank. If incorrect, please contact  "your healthcare provider. Carry this list with you in case of emergency.           Current Medications     amlodipine (NORVASC) 5 MG tablet Take 5 mg by mouth once daily.    clindamycin (CLEOCIN) 150 MG capsule Take 2 capsules (300 mg total) by mouth 4 (four) times daily.    L norgest/e.estradiol-e.estrad (AMETHIA) 0.15 mg-30 mcg (84)/10 mcg (7) 3MPk Take by mouth.    levothyroxine (SYNTHROID) 100 MCG tablet Take 100 mcg by mouth once daily.    acetaminophen (TYLENOL) 500 MG tablet Take 2 tablets (1,000 mg total) by mouth 3 (three) times daily as needed for Pain.    diphenhydrAMINE (BENADRYL) 25 mg capsule Take 1 each (25 mg total) by mouth every 6 (six) hours as needed for Itching or Allergies.    ibuprofen (ADVIL,MOTRIN) 200 MG tablet Take 2 tablets (400 mg total) by mouth every 6 (six) hours as needed for Pain.    loratadine (CLARITIN) 10 mg tablet Take 1 tablet (10 mg total) by mouth once daily.           Clinical Reference Information           Your Vitals Were     BP Pulse Temp Resp Height Weight    118/74 (BP Location: Right arm, Patient Position: Sitting, BP Method: Automatic) 76 98.4 °F (36.9 °C) (Oral) 18 5' 5" (1.651 m) 59 kg (130 lb)    SpO2 BMI             100% 21.63 kg/m2         Allergies as of 5/12/2017     No Known Allergies      Immunizations Administered on Date of Encounter - 5/12/2017     None      ED Micro, Lab, POCT     Start Ordered       Status Ordering Provider    05/12/17 1640 05/12/17 1639  CBC auto differential  STAT      Final result     05/12/17 1640 05/12/17 1639  APTT  STAT      Final result     05/12/17 1640 05/12/17 1639  Protime-INR  STAT      Final result     05/12/17 1615 05/12/17 1614  POCT urine pregnancy  Once      Final result       ED Imaging Orders     Start Ordered       Status Ordering Provider    05/12/17 1635 05/12/17 1635  US Lower Extremity Veins Left  1 time imaging      Final result         Discharge Instructions       Make appointment with primary care physician " for follow-up care and further evaluation of swelling and rash.  You may also make appointment with dermatologist provided by previous provider for further evaluation a rash.    Discharge References/Attachments     LEG SWELLING IN A SINGLE LEG (ENGLISH)      [facility] Sign-Up     Activating your [facility] account is as easy as 1-2-3!     1) Visit [email or web address], select Sign Up Now, enter this activation code and your date of birth, then select Next.  [email or web address]  Expires: 6/23/2017  7:21 PM      2) Create a username and password to use when you visit [facility] in the future and select a security question in case you lose your password and select Next.    3) Enter your e-mail address and click Sign Up!    Additional Information  If you have questions, please e-mail [email or web address] or call [phone] to talk to our [facility] staff. Remember, [facility] is NOT to be used for urgent needs. For medical emergencies, dial 911.          [facility] complies with applicable Federal civil rights laws and does not discriminate on the basis of race, color, national origin, age, disability, or sex.        Language Assistance Services     ATTENTION: Language assistance services are available, free of charge. Please call [phone].      ATENCIÓN: Si habla kim, tiene a chaparro disposición servicios gratuitos de asistencia lingüística. Llame al [phone].     CHÚ Ý: N?u b?n nói Ti?ng Vi?t, có các d?ch v? h? tr? ngôn ng? mi?n phí dành cho b?n. G?i s? [phone].

## 2017-05-12 NOTE — ED PROVIDER NOTES
Encounter Date: 5/12/2017       History     Chief Complaint   Patient presents with    Recheck     + recheck of swelling pain to the left lower ankle and foot from Tuesday.     Cellulitis     + cellulitis originally seen on Tuesday treated with oral antibiotics and injections some improvement per the patient not much. + pain with walking . + redness + red spots     Review of patient's allergies indicates:  No Known Allergies  HPI Comments: Kaylee Lefort is a 26 y.o. female who presents to the Emergency Department with  left leg swelling.  Patient reports her rash is getting better but now her left leg is getting very swollen.  Patient has had redness pain and swelling to bilateral lower extremity. Patient states 5 days ago she was bit by mosquitoes and then noticed red painful bumps which are getting better over the last few days.   However left leg swelling is getting worse.  Patient denies any injury.    Patient reports rash is still present but not as red as it used to be.  Patient has been taking all medications as prescribed. The current episode started 5 days ago. The problem has been gradually worsening. The problem is associated with an insect bite/sting. Affected Location: Left lower leg and ankle and Right lower leg. The pain is at a severity of 3/10.  Pain is worse than left leg versus right leg.  The pain has been intermittent since onset. Associated symptoms include swelling, itching and pain. Pertinent negatives include no blisters and no weeping. She has tried anti-itch cream for the symptoms.  Itching and pain has gotten better since treatment on prior ED visit however the swelling is getting worse.    Patient is a 26 y.o. female presenting with the following complaint: leg pain. The history is provided by the patient.   Leg Pain        Past Medical History:   Diagnosis Date    Hypertension     Thyroid disease      Past Surgical History:   Procedure Laterality Date    BACK SURGERY       History  reviewed. No pertinent family history.  Social History   Substance Use Topics    Smoking status: Never Smoker    Smokeless tobacco: None    Alcohol use No     Review of Systems   Constitutional: Negative for chills and fever.   Respiratory: Negative for shortness of breath and wheezing.    Cardiovascular: Positive for leg swelling.   Genitourinary: Negative for dysuria.   Skin: Positive for rash.   Neurological: Negative for headaches.   All other systems reviewed and are negative.      Physical Exam   Initial Vitals   BP Pulse Resp Temp SpO2   05/12/17 1244 05/12/17 1244 05/12/17 1244 05/12/17 1244 05/12/17 1244   156/92 96 18 98.9 °F (37.2 °C) 98 %     Physical Exam    Nursing note and vitals reviewed.  Constitutional: She appears well-developed and well-nourished.   HENT:   Head: Normocephalic and atraumatic.   Right Ear: External ear normal.   Left Ear: External ear normal.   Nose: Nose normal.   Mouth/Throat: Oropharynx is clear and moist.   Eyes: EOM are normal. Pupils are equal, round, and reactive to light.   Neck: Normal range of motion. Neck supple.   Cardiovascular: Normal rate, regular rhythm, normal heart sounds and intact distal pulses.   Pulmonary/Chest: Breath sounds normal. No respiratory distress. She has no wheezes. She has no rhonchi. She has no rales.   Abdominal: Soft. Bowel sounds are normal. She exhibits no distension. There is no tenderness. There is no rebound.   Musculoskeletal: Normal range of motion. She exhibits edema and tenderness.   Pulses ×4.  Positive edema to left lower extremity.  Asymmetrical swelling to left lower extremity.   Neurological: She is alert and oriented to person, place, and time. She has normal strength and normal reflexes. She displays normal reflexes. No sensory deficit.   Skin: Skin is warm. Petechiae and rash noted. Rash is not vesicular. There is erythema. No cyanosis.   Psychiatric: She has a normal mood and affect.                     ED Course    Procedures  Labs Reviewed   POCT CMP - Abnormal; Notable for the following:        Result Value    Albumin, POC 3.2 (*)     POC Potassium 3.6 (*)     All other components within normal limits   POCT CBC   POCT PROTIME-INR   POCT CMP   ISTAT PROCEDURE     INR 0.9.  CBC white blood count 4.4, hemoglobin 12.8, hematocrit 37.6 and platelets 259                            ED Course     MDM  Cc rash lower extremity is with left leg swelling.  Redness looks better since prior visit.  Patient is nontoxic in appearance.  Left leg with swelling and asymmetry when compared to right leg.  At this time would like to obtain ultrasound to rule out DVT.  Unable to perform at this facility as such we'll transfer to [facility] for ultrasound of the left lower extremity.  Consultation for transfer.  Discussed patient's presentation, past medical history, physical exam, and ED course.  Also discussed vital signs and unilateral left leg swelling, needing LE venous US.   patient declined ambulance and will go via POV.  Patient is agreeable to transfer.  ED to ED  Dr Stas Thomas accepted.    Clinical Impression:   The primary encounter diagnosis was Pain and swelling of left lower leg. A diagnosis of Cellulitis, unspecified cellulitis site was also pertinent to this visit.          Naila Toscano,   05/12/17 1510       Naila Toscano,   05/12/17 1516

## 2017-05-12 NOTE — ED TRIAGE NOTES
Reports visiting urgent care today and 3 days ago    For (cellulitis)  rash and swelling to LLE  5 days. Derm appt. Next next. Was instructed to proceed to ED by Dr. Toscano on today's visit due to swelling. Reports rash has improved. Was given abx Via IV today.

## 2017-05-17 ENCOUNTER — HOSPITAL ENCOUNTER (EMERGENCY)
Facility: HOSPITAL | Age: 27
Discharge: HOME | End: 2017-05-17
Attending: EMERGENCY MEDICINE | Admitting: EMERGENCY MEDICINE
Payer: MEDICAID

## 2017-05-17 VITALS
WEIGHT: 130 LBS | RESPIRATION RATE: 20 BRPM | DIASTOLIC BLOOD PRESSURE: 81 MMHG | HEART RATE: 95 BPM | TEMPERATURE: 99 F | BODY MASS INDEX: 21.66 KG/M2 | HEIGHT: 65 IN | OXYGEN SATURATION: 97 % | SYSTOLIC BLOOD PRESSURE: 121 MMHG

## 2017-05-17 DIAGNOSIS — R21 RASH: Primary | ICD-10-CM

## 2017-05-17 DIAGNOSIS — T50.905A DRUG REACTION, INITIAL ENCOUNTER: ICD-10-CM

## 2017-05-17 DIAGNOSIS — R00.0 TACHYCARDIA: ICD-10-CM

## 2017-05-17 LAB
ALBUMIN SERPL BCP-MCNC: 4.1 G/DL
ALP SERPL-CCNC: 76 U/L
ALT SERPL W/O P-5'-P-CCNC: 14 U/L
ANION GAP SERPL CALC-SCNC: 11 MMOL/L
AST SERPL-CCNC: 18 U/L
B-HCG UR QL: NEGATIVE
BACTERIA #/AREA URNS AUTO: ABNORMAL /HPF
BASOPHILS # BLD AUTO: 0.04 K/UL
BASOPHILS NFR BLD: 0.6 %
BILIRUB SERPL-MCNC: 0.4 MG/DL
BILIRUB UR QL STRIP: NEGATIVE
BUN SERPL-MCNC: 13 MG/DL
CALCIUM SERPL-MCNC: 10.3 MG/DL
CHLORIDE SERPL-SCNC: 107 MMOL/L
CLARITY UR REFRACT.AUTO: ABNORMAL
CO2 SERPL-SCNC: 21 MMOL/L
COLOR UR AUTO: YELLOW
CREAT SERPL-MCNC: 0.8 MG/DL
CRP SERPL-MCNC: 12.4 MG/L
CTP QC/QA: YES
D DIMER PPP IA.FEU-MCNC: 1.17 MG/L FEU
DIFFERENTIAL METHOD: NORMAL
EOSINOPHIL # BLD AUTO: 0.1 K/UL
EOSINOPHIL NFR BLD: 0.9 %
ERYTHROCYTE [DISTWIDTH] IN BLOOD BY AUTOMATED COUNT: 14.4 %
ERYTHROCYTE [SEDIMENTATION RATE] IN BLOOD BY WESTERGREN METHOD: 20 MM/HR
EST. GFR  (AFRICAN AMERICAN): >60 ML/MIN/1.73 M^2
EST. GFR  (NON AFRICAN AMERICAN): >60 ML/MIN/1.73 M^2
GLUCOSE SERPL-MCNC: 83 MG/DL
GLUCOSE UR QL STRIP: NEGATIVE
HCT VFR BLD AUTO: 42.4 %
HGB BLD-MCNC: 14.2 G/DL
HGB UR QL STRIP: ABNORMAL
HYALINE CASTS UR QL AUTO: 3 /LPF
INR PPP: 0.9
KETONES UR QL STRIP: NEGATIVE
LEUKOCYTE ESTERASE UR QL STRIP: NEGATIVE
LYMPHOCYTES # BLD AUTO: 1.7 K/UL
LYMPHOCYTES NFR BLD: 24.3 %
MCH RBC QN AUTO: 30 PG
MCHC RBC AUTO-ENTMCNC: 33.5 %
MCV RBC AUTO: 90 FL
MICROSCOPIC COMMENT: ABNORMAL
MONOCYTES # BLD AUTO: 0.5 K/UL
MONOCYTES NFR BLD: 7.2 %
NEUTROPHILS # BLD AUTO: 4.6 K/UL
NEUTROPHILS NFR BLD: 66.9 %
NITRITE UR QL STRIP: NEGATIVE
PH UR STRIP: 6 [PH] (ref 5–8)
PLATELET # BLD AUTO: 313 K/UL
PMV BLD AUTO: 10 FL
POTASSIUM SERPL-SCNC: 3.7 MMOL/L
PROT SERPL-MCNC: 8.4 G/DL
PROT UR QL STRIP: NEGATIVE
PROTHROMBIN TIME: 9.8 SEC
RBC # BLD AUTO: 4.74 M/UL
RBC #/AREA URNS AUTO: 47 /HPF (ref 0–4)
SODIUM SERPL-SCNC: 139 MMOL/L
SP GR UR STRIP: 1.02 (ref 1–1.03)
SQUAMOUS #/AREA URNS AUTO: 7 /HPF
TSH SERPL DL<=0.005 MIU/L-ACNC: 1.39 UIU/ML
URN SPEC COLLECT METH UR: ABNORMAL
UROBILINOGEN UR STRIP-ACNC: NEGATIVE EU/DL
WBC # BLD AUTO: 6.82 K/UL
WBC #/AREA URNS AUTO: 4 /HPF (ref 0–5)

## 2017-05-17 PROCEDURE — 85025 COMPLETE CBC W/AUTO DIFF WBC: CPT

## 2017-05-17 PROCEDURE — 99284 EMERGENCY DEPT VISIT MOD MDM: CPT | Mod: 25

## 2017-05-17 PROCEDURE — 63600175 PHARM REV CODE 636 W HCPCS: Performed by: EMERGENCY MEDICINE

## 2017-05-17 PROCEDURE — 85610 PROTHROMBIN TIME: CPT

## 2017-05-17 PROCEDURE — 93010 ELECTROCARDIOGRAM REPORT: CPT | Mod: ,,, | Performed by: INTERNAL MEDICINE

## 2017-05-17 PROCEDURE — 85651 RBC SED RATE NONAUTOMATED: CPT

## 2017-05-17 PROCEDURE — 25500020 PHARM REV CODE 255: Performed by: EMERGENCY MEDICINE

## 2017-05-17 PROCEDURE — 86140 C-REACTIVE PROTEIN: CPT

## 2017-05-17 PROCEDURE — 85379 FIBRIN DEGRADATION QUANT: CPT

## 2017-05-17 PROCEDURE — 84443 ASSAY THYROID STIM HORMONE: CPT

## 2017-05-17 PROCEDURE — 81001 URINALYSIS AUTO W/SCOPE: CPT

## 2017-05-17 PROCEDURE — 96360 HYDRATION IV INFUSION INIT: CPT

## 2017-05-17 PROCEDURE — 96361 HYDRATE IV INFUSION ADD-ON: CPT

## 2017-05-17 PROCEDURE — 25000003 PHARM REV CODE 250: Performed by: EMERGENCY MEDICINE

## 2017-05-17 PROCEDURE — 99284 EMERGENCY DEPT VISIT MOD MDM: CPT | Mod: ,,, | Performed by: EMERGENCY MEDICINE

## 2017-05-17 PROCEDURE — 81025 URINE PREGNANCY TEST: CPT | Performed by: EMERGENCY MEDICINE

## 2017-05-17 PROCEDURE — 25000003 PHARM REV CODE 250: Performed by: STUDENT IN AN ORGANIZED HEALTH CARE EDUCATION/TRAINING PROGRAM

## 2017-05-17 PROCEDURE — 80053 COMPREHEN METABOLIC PANEL: CPT

## 2017-05-17 RX ORDER — DIPHENHYDRAMINE HCL 25 MG
25 CAPSULE ORAL EVERY 6 HOURS PRN
Qty: 20 CAPSULE | Refills: 0 | Status: SHIPPED | OUTPATIENT
Start: 2017-05-17

## 2017-05-17 RX ORDER — PREDNISONE 20 MG/1
60 TABLET ORAL
Status: COMPLETED | OUTPATIENT
Start: 2017-05-17 | End: 2017-05-17

## 2017-05-17 RX ORDER — CEPHALEXIN 500 MG/1
500 CAPSULE ORAL
Status: COMPLETED | OUTPATIENT
Start: 2017-05-17 | End: 2017-05-17

## 2017-05-17 RX ORDER — ACETAMINOPHEN 325 MG/1
650 TABLET ORAL ONCE AS NEEDED
Status: COMPLETED | OUTPATIENT
Start: 2017-05-17 | End: 2017-05-17

## 2017-05-17 RX ORDER — DIPHENHYDRAMINE HCL 25 MG
25 CAPSULE ORAL EVERY 6 HOURS PRN
Qty: 20 CAPSULE | Refills: 0 | Status: SHIPPED | OUTPATIENT
Start: 2017-05-17 | End: 2017-05-17

## 2017-05-17 RX ORDER — PREDNISONE 20 MG/1
40 TABLET ORAL DAILY
Qty: 8 TABLET | Refills: 0 | Status: SHIPPED | OUTPATIENT
Start: 2017-05-17 | End: 2017-05-17

## 2017-05-17 RX ORDER — DIPHENHYDRAMINE HCL 25 MG
25 CAPSULE ORAL ONCE
Status: COMPLETED | OUTPATIENT
Start: 2017-05-17 | End: 2017-05-17

## 2017-05-17 RX ORDER — NAPROXEN 375 MG/1
375 TABLET ORAL 2 TIMES DAILY
COMMUNITY

## 2017-05-17 RX ORDER — PREDNISONE 20 MG/1
40 TABLET ORAL DAILY
Qty: 8 TABLET | Refills: 0 | Status: SHIPPED | OUTPATIENT
Start: 2017-05-17 | End: 2017-05-21

## 2017-05-17 RX ORDER — CEPHALEXIN 500 MG/1
500 CAPSULE ORAL EVERY 6 HOURS
Qty: 28 CAPSULE | Refills: 0 | Status: SHIPPED | OUTPATIENT
Start: 2017-05-17 | End: 2017-05-17

## 2017-05-17 RX ORDER — BUTALBITAL, ACETAMINOPHEN AND CAFFEINE 50; 325; 40 MG/1; MG/1; MG/1
1 TABLET ORAL ONCE
Status: DISCONTINUED | OUTPATIENT
Start: 2017-05-17 | End: 2017-05-17 | Stop reason: HOSPADM

## 2017-05-17 RX ADMIN — ACETAMINOPHEN 650 MG: 325 TABLET ORAL at 07:05

## 2017-05-17 RX ADMIN — IOHEXOL 75 ML: 350 INJECTION, SOLUTION INTRAVENOUS at 07:05

## 2017-05-17 RX ADMIN — SODIUM CHLORIDE 1000 ML: 0.9 INJECTION, SOLUTION INTRAVENOUS at 03:05

## 2017-05-17 RX ADMIN — CEPHALEXIN 500 MG: 500 CAPSULE ORAL at 08:05

## 2017-05-17 RX ADMIN — PREDNISONE 60 MG: 20 TABLET ORAL at 03:05

## 2017-05-17 RX ADMIN — DIPHENHYDRAMINE HYDROCHLORIDE 25 MG: 25 CAPSULE ORAL at 03:05

## 2017-05-17 NOTE — PROVIDER PROGRESS NOTES - EMERGENCY DEPT.
Encounter Date: 5/17/2017    ED Physician Progress Notes         EKG - STEMI Decision  Initial Reading: No STEMI present.

## 2017-05-17 NOTE — ED AVS SNAPSHOT
[facility]  [address] [facility] [address]               Kaylee Lefort   2017  2:22 PM   ED    Description:  Female : 1990   Department:  [facility]           Your Care was Coordinated By:     Provider Role From To    Teo Nance MD Attending Provider 17 1430 --    Graciela Barrera MD Resident 17 --    Katlyn Raymond PA-C Physician Assistant 17      Reason for Visit     Rash           Diagnoses this Visit        Comments    Cellulitis of left ankle    -  Primary     Tachycardia         Rash         Drug reaction, initial encounter           ED Disposition     ED Disposition Condition Comment    Discharge             To Do List           Follow-up Information     Follow up with SELVIN Rosa In 2 days.    Specialty:  Family Medicine    Why:  for re-evaluation    Contact information:    [address] [facility] [address]  [place] [address] [phone]          [facility]    Specialty:  Emergency Medicine    Why:  As needed, If symptoms worsen    Contact information:    [address] [facility] [address] [phone]        Follow up with [facility] In 1 day.    Specialty:  Dermatology    Why:  Call for an appointment to get a follow up with Dermatology w/in one week if your rash and ankle swelling / redness hasn't resolved.    Contact information:    [address] [facility] [id number] [phone]    Additional information:    Clinic [place], 11th Floor - [address]       These Medications        Disp Refills Start End    cephALEXin (KEFLEX) 500 MG capsule 28 capsule 0 2017    Take 1 capsule (500 mg total) by mouth every 6 (six) hours. - Oral    Lactobacillus acidoph-L. bifid 1 billion cell Cap 20 capsule 0 2017    Take 1 capsule by mouth 2 (two) times daily. To prevent  diarrhea from taking antibiotics - Oral    predniSONE (DELTASONE) 20 MG tablet 8 tablet 0 5/17/2017 5/21/2017    Take 2 tablets (40 mg total) by mouth once daily. For allergic reaction - Oral    diphenhydrAMINE (BENADRYL) 25 mg capsule 20 capsule 0 5/17/2017     Take 1 each (25 mg total) by mouth every 6 (six) hours as needed for Itching or Allergies. - Oral      [facility]     [facility] On [facility] Nurse Care Line - 24/7 Assistance  Unless otherwise directed by your provider, please contact [facility], our nurse care line that is available for 24/7 assistance.     Registered nurses in the [facility] provide: appointment scheduling, clinical advisement, health education, and other advisory services.  Call: [phone] (toll free)               Medications           Message regarding Medications     Verify the changes and/or additions to your medication regime listed below are the same as discussed with your clinician today.  If any of these changes or additions are incorrect, please notify your healthcare provider.        START taking these NEW medications        Refills    cephALEXin (KEFLEX) 500 MG capsule 0    Sig: Take 1 capsule (500 mg total) by mouth every 6 (six) hours.    Class: Print    Route: Oral    Lactobacillus acidoph-L. bifid 1 billion cell Cap 0    Sig: Take 1 capsule by mouth 2 (two) times daily. To prevent diarrhea from taking antibiotics    Class: Print    Route: Oral    predniSONE (DELTASONE) 20 MG tablet 0    Sig: Take 2 tablets (40 mg total) by mouth once daily. For allergic reaction    Class: Print    Route: Oral    diphenhydrAMINE (BENADRYL) 25 mg capsule 0    Sig: Take 1 each (25 mg total) by mouth every 6 (six) hours as needed for Itching or Allergies.    Class: Print    Route: Oral      These medications were administered today        Dose Freq    sodium chloride 0.9% bolus 1,000 mL 1,000 mL ED 1 Time    Sig: Inject 1,000 mLs into the vein ED 1 Time.    Class: Normal     Route: Intravenous    predniSONE tablet 60 mg 60 mg ED 1 Time    Sig: Take 3 tablets (60 mg total) by mouth ED 1 Time.    Class: Normal    Route: Oral    diphenhydrAMINE capsule 25 mg 25 mg Once    Sig: Take 1 each (25 mg total) by mouth once.    Class: Normal    Route: Oral    omnipaque 350 iohexol 75 mL 75 mL IMG once as needed    Sig: Inject 75 mLs into the vein ONCE PRN for contrast.    Class: Normal    Route: Intravenous    butalbital-acetaminophen-caffeine -40 mg per tablet 1 tablet 1 tablet Once    Sig: Take 1 tablet by mouth once.    Class: Normal    Route: Oral    acetaminophen tablet 650 mg 650 mg Once as needed    Sig: Take 2 tablets (650 mg total) by mouth once as needed for Headaches or Temperature greater than 101.    Class: Normal    Route: Oral    cephALEXin capsule 500 mg 500 mg ED 1 Time    Sig: Take 1 capsule (500 mg total) by mouth ED 1 Time.    Class: Normal    Route: Oral           Verify that the below list of medications is an accurate representation of the medications you are currently taking.  If none reported, the list may be blank. If incorrect, please contact your healthcare provider. Carry this list with you in case of emergency.           Current Medications     amlodipine (NORVASC) 5 MG tablet Take 5 mg by mouth once daily.    clindamycin (CLEOCIN) 150 MG capsule Take 2 capsules (300 mg total) by mouth 4 (four) times daily.    L norgest/e.estradiol-e.estrad (AMETHIA) 0.15 mg-30 mcg (84)/10 mcg (7) 3MPk Take by mouth.    levothyroxine (SYNTHROID) 100 MCG tablet Take 100 mcg by mouth once daily.    loratadine (CLARITIN) 10 mg tablet Take 1 tablet (10 mg total) by mouth once daily.    naproxen (EC-NAPROSYN) 375 MG TbEC EC tablet Take 375 mg by mouth 2 (two) times daily.    acetaminophen (TYLENOL) 500 MG tablet Take 2 tablets (1,000 mg total) by mouth 3 (three) times daily as needed for Pain.    butalbital-acetaminophen-caffeine -40 mg per tablet 1 tablet Take 1 tablet by  "mouth once.    cephALEXin (KEFLEX) 500 MG capsule Take 1 capsule (500 mg total) by mouth every 6 (six) hours.    cephALEXin capsule 500 mg Take 1 capsule (500 mg total) by mouth ED 1 Time.    diphenhydrAMINE (BENADRYL) 25 mg capsule Take 1 each (25 mg total) by mouth every 6 (six) hours as needed for Itching or Allergies.    ibuprofen (ADVIL,MOTRIN) 200 MG tablet Take 2 tablets (400 mg total) by mouth every 6 (six) hours as needed for Pain.    Lactobacillus acidoph-L. bifid 1 billion cell Cap Take 1 capsule by mouth 2 (two) times daily. To prevent diarrhea from taking antibiotics    predniSONE (DELTASONE) 20 MG tablet Take 2 tablets (40 mg total) by mouth once daily. For allergic reaction           Clinical Reference Information           Your Vitals Were     BP Pulse Temp Resp Height Weight    130/83 93 99 °F (37.2 °C) (Oral) 20 5' 5" (1.651 m) 59 kg (130 lb)    Last Period SpO2 BMI          03/27/2017 98% 21.63 kg/m2        Allergies as of 5/17/2017        Reactions    Clindamycin Rash      Immunizations Administered on Date of Encounter - 5/17/2017     None      ED Micro, Lab, POCT     Start Ordered       Status Ordering Provider    05/17/17 1537 05/17/17 1536  POCT urine pregnancy  Once      Final result     05/17/17 1515 05/17/17 1515  Urinalysis Microscopic  Once      Final result     05/17/17 1503 05/17/17 1515  Sedimentation rate, manual  STAT      Final result     05/17/17 1503 05/17/17 1515  C-reactive protein  STAT      Final result     05/17/17 1502 05/17/17 1515  Protime-INR  STAT      Final result     05/17/17 1502 05/17/17 1515  TSH  STAT      Final result     05/17/17 1502 05/17/17 1515  Urinalysis  STAT      Final result     05/17/17 1502 05/17/17 1515    STAT,   Status:  Canceled      Canceled     05/17/17 1501 05/17/17 1515  CBC auto differential  STAT      Final result     05/17/17 1501 05/17/17 1515  Comprehensive metabolic panel  STAT      Final result     05/17/17 1501 05/17/17 1515  D dimer, " quantitative  STAT      Final result       ED Imaging Orders     Start Ordered       Status Ordering Provider    05/17/17 1707 05/17/17 1707  CTA Chest Non-Coronary (PE Study)  1 time imaging      Final result         Discharge Instructions         Cellulitis  Cellulitis is an infection of the deep layers of skin. A break in the skin, such as a cut or scratch, can let bacteria under the skin. If the bacteria get to deep layers of the skin, it can be serious. If not treated, cellulitis can get into the bloodstream and lymph nodes. The infection can then spread throughout the body. This causes serious illness.  Cellulitis causes the affected skin to become red, swollen, warm, and sore. The reddened areas have a visible border. An open sore may leak fluid (pus). You may have a fever, chills, and pain.  Cellulitis is treated with antibiotics taken for 7 to 10 days. An open sore may be cleaned and covered with cool wet gauze. Symptoms should get better 1 to 2 days after treatment is started. Make sure to take all the antibiotics for the full number of days until they are gone. Keep taking the medicine even if your symptoms go away.  Home care  Follow these tips:  · Limit the use of the part of your body with cellulitis.   · If the infection is on your leg, keep your leg raised while sitting. This will help to reduce swelling.  · Take all of the antibiotic medicine exactly as directed until it is gone. Do not miss any doses, especially during the first 7 days. Dont stop taking the medicine when your symptoms get better.  · Keep the affected area clean and dry.  · Wash your hands with soap and warm water before and after touching your skin. Anyone else who touches your skin should also wash his or her hands. Don't share towels.  Follow-up care  Follow up with your healthcare provider, or as advised. If your infection does not go away on the first antibiotic, your healthcare provider will prescribe a different one.  When  to seek medical advice  Call your healthcare provider right away if any of these occur:  · Red areas that spread  · Swelling or pain that gets worse  · Fluid leaking from the skin (pus)  · Fever higher of 100.4º F (38.0º C) or higher after 2 days on antibiotics  Date Last Reviewed: 9/1/2016  © 7450-0913 [employer]. [address] [place] [address]. All rights reserved. This information is not intended as a substitute for professional medical care. Always follow your healthcare professional's instructions.          Discharge References/Attachments     ALLERGIC REACTION, DRUG (ENGLISH)      [facility] Sign-Up     Activating your [facility] account is as easy as 1-2-3!     1) Visit [email or web address], select Sign Up Now, enter this activation code and your date of birth, then select Next.  [email or web address]  Expires: 6/23/2017  7:21 PM      2) Create a username and password to use when you visit [facility] in the future and select a security question in case you lose your password and select Next.    3) Enter your e-mail address and click Sign Up!    Additional Information  If you have questions, please e-mail [email or web address] or call [phone] to talk to our [facility] staff. Remember, [facility] is NOT to be used for urgent needs. For medical emergencies, dial 911.          [facility] complies with applicable Federal civil rights laws and does not discriminate on the basis of race, color, national origin, age, disability, or sex.        Language Assistance Services     ATTENTION: Language assistance services are available, free of charge. Please call [phone].      ATENCIÓN: Si habla español, tiene a chaparro disposición servicios gratuitos de asistencia lingüística. Llame al [phone].     CHÚ Ý: N?u b?n nói Ti?ng Vi?t, có các d?ch v? h? tr? ngôn ng? mi?n phí dành cho b?n. G?i s? [phone].

## 2017-05-17 NOTE — ED PROVIDER NOTES
Encounter Date: 5/17/2017    SCRIBE #1 NOTE: I, Yang Flor, am scribing for, and in the presence of,  Dr. Nance. I have scribed the following portions of the note - the Resident attestation.       History     Chief Complaint   Patient presents with    Rash     on clindamycin for 1 week for cellulitis, now woke up covered in rash, feeling very ill, heart keeps beating fast     Review of patient's allergies indicates:  No Known Allergies  HPI   27 yo F with PMHx HTN presents after approx 11 d of LLE ankle swelling and redness and some redness of RLE around ankle as well..  SDenies trauma.  She presented to ED and Urgent care three times over the past week for this and had been worked up for DVT as well as treated for cellulitis.  US BLE found no DVT.  Patient has been on clindamycin for LLE cellulitis.  She states the redness and rash on her right and left ankle didn't really get better on the clindamycin.  Then this AM she woke up and noted a diffuse rash all over her body that was itching.  Associated symptoms of rapid heart rate, mild SOB noted x 1 d.  She denies N/V/D, wheezing, sensation of throat swelling or facial swelling.  No pain in LLE at this time.  Rash is very itchy.  Patient denies taking any new medications.  Denies new soaps, detergents, perfumes.   Otherwise denies past hx of blood clots.  No hx DVT or PE.    Past Medical History:   Diagnosis Date    Hypertension     Thyroid disease      Past Surgical History:   Procedure Laterality Date    ABDOMINAL SURGERY      BACK SURGERY      TONSILLECTOMY       History reviewed. No pertinent family history.  Social History   Substance Use Topics    Smoking status: Never Smoker    Smokeless tobacco: None    Alcohol use No     Review of Systems   Constitutional: Negative for chills and fever.   Respiratory: Positive for shortness of breath. Negative for cough.    Cardiovascular: Positive for leg swelling. Negative for chest pain and palpitations.    Musculoskeletal: Positive for joint swelling. Negative for arthralgias and myalgias.   Skin: Positive for rash. Negative for wound.   Neurological: Negative for weakness and numbness.   Hematological: Negative for adenopathy. Does not bruise/bleed easily.   Psychiatric/Behavioral: Negative for agitation and confusion.   All other systems reviewed and are negative.      Physical Exam   Initial Vitals   BP Pulse Resp Temp SpO2   05/17/17 1310 05/17/17 1310 05/17/17 1310 05/17/17 1310 05/17/17 1310   154/100 142 18 98.9 °F (37.2 °C) 100 %     Vitals:    05/17/17 1512 05/17/17 1716 05/17/17 1937 05/17/17 1938   BP: 127/89 (!) 133/93 130/83    Pulse: 101 (!) 2  93   Resp: 20 20     Temp: 99.6 °F (37.6 °C) 99 °F (37.2 °C)     TempSrc: Oral Oral     SpO2: 99% 100% 98% 98%   Weight:       Height:        05/17/17 2023   BP: 121/81   Pulse: 95   Resp:    Temp: 99.1 °F (37.3 °C)   TempSrc: Oral   SpO2: 97%   Weight:    Height:          Physical Exam  Gen/Constitutional: Interactive. No acute distress.  Head: Normocephalic, Atraumatic.  Mild erythema over nose, cheeks, chin.  Neck: supple, no masses or LAD.  MP rash present over neck, occasional excoriations.  Eyes: PERRLA, conjunctiva clear  Ears, Nose and Throat: No rhinorrhea or stridor.  Cardiac: Reg Rhythm, No murmur.  Tachycardic to 120-140s.    Pulmonary: CTA Bilat, no wheezes, rhonchi, rales.  Symmetric expansion.  GI: Abdomen soft, non-tender, non-distended; no rebound or guarding  : No CVA tenderness.  Musculoskeletal: Extremities warm, well perfused--LLE minimal edema around ankle with rash as described.  Left and right ankle ranged w/o pain.  Skin: Diffuse maculopapular rash with all areas of rash blanching except the area on her bilateral ankles which is macular in nature, not papular and non-blanching, more prominent on left ankle - appears consistent with lymphangitis.  Around the ankle there is no surrounding erythema or calor or induration or fluctuance.      Neuro: Alert and Oriented x 3; No focal motor or sensory deficits.    Psych: Normal affect, speech/thought content appropriate.    ED Course   Procedures  Labs Reviewed   COMPREHENSIVE METABOLIC PANEL - Abnormal; Notable for the following:        Result Value    CO2 21 (*)     All other components within normal limits   D DIMER, QUANTITATIVE - Abnormal; Notable for the following:     D-Dimer 1.17 (*)     All other components within normal limits   URINALYSIS - Abnormal; Notable for the following:     Appearance, UA Cloudy (*)     Occult Blood UA 1+ (*)     All other components within normal limits   C-REACTIVE PROTEIN - Abnormal; Notable for the following:     CRP 12.4 (*)     All other components within normal limits   URINALYSIS MICROSCOPIC - Abnormal; Notable for the following:     RBC, UA 47 (*)     Hyaline Casts, UA 3 (*)     All other components within normal limits   POCT URINE PREGNANCY - Normal   CBC W/ AUTO DIFFERENTIAL   PROTIME-INR   TSH   SEDIMENTATION RATE, MANUAL     Imaging Results         CTA Chest Non-Coronary (PE Study) (Final result) Result time:  05/17/17 19:35:47    Final result by Roge Butts MD (05/17/17 19:35:47)    Impression:        No evidence of pulmonary thromboembolism, noting limitations above.    Several additional findings above.      ______________________________________     Electronically signed by resident: ROGE BUTTS MD  Date:     05/17/17  Time:    19:25            As the supervising and teaching physician, I personally reviewed the images and resident's interpretation and I agree with the findings.          Electronically signed by: HODA NOWAK MD  Date:     05/17/17  Time:    19:35     Narrative:    Time of Procedure: 05/17/17 18:50:51  Accession # [id number]    Technique: The chest was surveyed from the costophrenic angles through the lung apices at 3-mm increments after the administration of 75 cc of Omnipaque 350 intravenous contrast material according to the  PE protocol which is optimized for vascular contrast resolution.  Axial, sagittal and coronal maximum intensity projection images were reviewed.    Comparison: None    Findings:    Examination of the soft tissue and vascular structures at the base of the neck is unremarkable.    There is a left-sided aortic arch with 3 branch vessels.  The thoracic aorta maintains normal caliber, contour, and course without significant atherosclerotic calcification.  There is no evidence of aneurysmal dilation or dissection.    Evaluation of the pulmonary arteries is limited secondary to beam hardening artifact from spinal hardware, as well as contrast within the superior vena cava, however no filling defect is visualized to suggest presence of thromboembolism the level of the segmental arteries bilaterally.     The heart is not enlarged and there is no evidence of pericardial effusion.    There is no axillary, mediastinal, or hilar lymph node enlargement.      The esophagus maintains a normal course and caliber.    Limited images of the upper abdomen obtained during the course of this dedicated thoracic CT demonstrate no acute abnormalities.    The osseous structures demonstrate posterior spinal fusion hardware extending from level of T4 through the visualized lumbar spine.  The hardware appears intact.    The trachea and proximal airways are patent.    The lungs are symmetrically expanded and without evidence of consolidation, pneumothorax, mass, or significant pleural thickening.  There is no evidence of pleural fluid.                      Medical Decision Making:   Initial Assessment:   27 yo F with PMHx HTN (on amlodipine), hypothyroidism (on levothyroxine) presents to [facility] ED with rash suspicious for drug reaction after being started on clindamycin when she presented to ED approx 11 d ago.  Differential Diagnosis:   Drug exanthem  Ezcema  DTH rash/contact dermatitis  Independently Interpreted Test(s):   I have ordered and  independently interpreted EKG Reading(s) - see summary below       <> Summary of EKG Reading(s): Sinus tachycardia noted on EKG.  Appears otherwise fairly unremarkable.    Clinical Tests:   Lab Tests: Ordered and Reviewed  The following lab test(s) were unremarkable: CMP, CBC and D-Dimer       <> Summary of Lab: See below for significant labs.  ED Management:  3:47 PM UA, CBC, CMP, D-dimer, PT/INR, TSH, ESR, CRP pending.  Further studies pending results.  Will start Keflex and oral prednisone.    D-Dimer elevated to 1.17.  Work up otherwise relatively unremarkable.  ESR wnl at 20.  CRP slightly elevated to 12.4.  WBCs wnl at 6.82 k/uL on CBC.  CTA Chest to r/o PE in process.            Scribe Attestation:   Scribe #1: I performed the above scribed service and the documentation accurately describes the services I performed. I attest to the accuracy of the note.    Attending Attestation:   Physician Attestation Statement for Resident:  As the supervising MD   Physician Attestation Statement: I have personally seen and examined this patient.   I agree with the above history. -: Pt presents withy diffuse rash that is maculopapular in nature and itchy. I felt this rash was most likely secondary to allergic reaction to clindamycin. . She is just finishing a 7 day course of clindamycin for treatment of cellulitis of this area.   There does not appear to be active cellulitis in the left ankle at this time.  She has been seen 3 times in the past week at urgent care and throughout this time underwent an ultrasound of her left lower extremity which was negative for DVT. She is also tachycardic. I considered cellulitis, abscess, DVT, PE, necrotizing STI, meningococcemia, DIC, HSP, anaphylaxis, RMSF, lyme disease, scabies, bed bugs among other diagnoses.   Plan to check labs, including ESR, CRP, and d-dimer. Will give oral prednisone and benadryl for suspected allergic reaction / drug reaction.       D-dimer positive. Will  obtain CTA of the chest.    CTA chest negative for PE    Ultimately I felt that her rash was due to allergic reaction vs drug reaction.  I did not feel it was due to life or limb threatening cause.  I felt she was stable for o/p f/u with Dermatology.  I instructed her to call her dermatologist to reschedule her appointment w/in a week (she missed her appointment with Derm today because she was in the ER).  I did not feel she warranted continued treatment for cellulitis as there are no clinical signs of cellulitis on my exam at this time.      The patient was given strict return precautions and follow up instructions and expressed understanding of these.  The patient felt comfortable with the plan for discharge and I did as well.  Stable for DC.           As the supervising MD I agree with the above PE.    As the supervising MD I agree with the above treatment, course, plan, and disposition.          Physician Attestation for Scribe:  Physician Attestation Statement for Scribe #1: I, Dr. Nance, reviewed documentation, as scribed by Yang Flor in my presence, and it is both accurate and complete.                 ED Course     Clinical Impression:   The primary encounter diagnosis was Rash. Diagnoses of Tachycardia and Drug reaction, initial encounter were also pertinent to this visit.          Teo Nance MD  05/17/17 1403

## 2017-05-17 NOTE — ED NOTES
Pt identifiers Kaylee Lefort were checked and are correct  LOC: The patient is awake, alert, aware of environment with an appropriate affect. Oriented x3, speaking appropriately  APPEARANCE: Pt 3/10 tenderness to skin frp,  rash , in no acute distress, pt is clean and well groomed, clothing properly fastened  SKIN: Skin warm, dry and intact, normal skin turgor, moist mucus membranes Red rash noted all over body  RESPIRATORY: Airway is open and patent, respirations are spontaneous, even and unlabored, normal effort and rate Breath sounds clear gabbi to all lung fields on auscultation  CARDIAC: Normal rate and rhythm, tachycardia noted with heart rate at 104 bpm , capillary refill < 3 seconds, bilateral radial pulses 2+  ABDOMEN: Soft, nontender, nondistended. Bowel sounds present to all four quad of abd on auscultation  NEUROLOGIC: facial expression is symmetrical, patient moving all extremities spontaneously, normal sensation in all extremities when touched with a finger.  Follows all commands appropriately  MUSCULOSKELETAL: No obvious deformities.

## 2017-05-17 NOTE — ED TRIAGE NOTES
Pt had a rash to left lower leg and foot for past 10 days  Pt was to complete  her prescription of clindamycin today.  This morning pt awakened with a rash all over body  Pt states she had shortness of breath last night and this morning

## 2017-05-18 NOTE — DISCHARGE INSTRUCTIONS
Cellulitis  Cellulitis is an infection of the deep layers of skin. A break in the skin, such as a cut or scratch, can let bacteria under the skin. If the bacteria get to deep layers of the skin, it can be serious. If not treated, cellulitis can get into the bloodstream and lymph nodes. The infection can then spread throughout the body. This causes serious illness.  Cellulitis causes the affected skin to become red, swollen, warm, and sore. The reddened areas have a visible border. An open sore may leak fluid (pus). You may have a fever, chills, and pain.  Cellulitis is treated with antibiotics taken for 7 to 10 days. An open sore may be cleaned and covered with cool wet gauze. Symptoms should get better 1 to 2 days after treatment is started. Make sure to take all the antibiotics for the full number of days until they are gone. Keep taking the medicine even if your symptoms go away.  Home care  Follow these tips:  · Limit the use of the part of your body with cellulitis.   · If the infection is on your leg, keep your leg raised while sitting. This will help to reduce swelling.  · Take all of the antibiotic medicine exactly as directed until it is gone. Do not miss any doses, especially during the first 7 days. Dont stop taking the medicine when your symptoms get better.  · Keep the affected area clean and dry.  · Wash your hands with soap and warm water before and after touching your skin. Anyone else who touches your skin should also wash his or her hands. Don't share towels.  Follow-up care  Follow up with your healthcare provider, or as advised. If your infection does not go away on the first antibiotic, your healthcare provider will prescribe a different one.  When to seek medical advice  Call your healthcare provider right away if any of these occur:  · Red areas that spread  · Swelling or pain that gets worse  · Fluid leaking from the skin (pus)  · Fever higher of 100.4º F (38.0º C) or higher after 2 days  on antibiotics  Date Last Reviewed: 9/1/2016  © 0625-0540 [employer] [address] [place] [address], [place] [address]. All rights reserved. This information is not intended as a substitute for professional medical care. Always follow your healthcare professional's instructions.

## 2017-11-02 ENCOUNTER — OFFICE VISIT (OUTPATIENT)
Dept: URGENT CARE | Facility: CLINIC | Age: 27
End: 2017-11-02
Payer: COMMERCIAL

## 2017-11-02 VITALS
OXYGEN SATURATION: 99 % | TEMPERATURE: 98 F | BODY MASS INDEX: 22.49 KG/M2 | DIASTOLIC BLOOD PRESSURE: 78 MMHG | WEIGHT: 135 LBS | HEIGHT: 65 IN | HEART RATE: 80 BPM | SYSTOLIC BLOOD PRESSURE: 120 MMHG

## 2017-11-02 DIAGNOSIS — R53.1 WEAKNESS: Primary | ICD-10-CM

## 2017-11-02 DIAGNOSIS — R05.9 COUGH: ICD-10-CM

## 2017-11-02 DIAGNOSIS — R11.0 NAUSEA: ICD-10-CM

## 2017-11-02 LAB
B-HCG UR QL: NEGATIVE
BILIRUB UR QL STRIP: POSITIVE
CTP QC/QA: YES
GLUCOSE SERPL-MCNC: 71 MG/DL (ref 70–110)
GLUCOSE UR QL STRIP: NEGATIVE
KETONES UR QL STRIP: NEGATIVE
LEUKOCYTE ESTERASE UR QL STRIP: POSITIVE
PH, POC UA: 6 (ref 5–8)
POC ANION GAP: ABNORMAL MMOL/L (ref 10–20)
POC BLOOD, URINE: NEGATIVE
POC BUN: 25 MMOL/L (ref 8–26)
POC CHLORIDE: 100 MMOL/L (ref 98–109)
POC CREATININE: 0.8 MG/DL (ref 0.6–1.3)
POC HEMATOCRIT: 42 %PCV (ref 37–47)
POC HEMOGLOBIN: 14.3 G/DL (ref 12.5–16)
POC ICA: ABNORMAL MMOL/L (ref 1.12–1.32)
POC NITRATES, URINE: NEGATIVE
POC POTASSIUM: 9 MMOL/L (ref 3.5–4.9)
POC SODIUM: 131 MMOL/L (ref 138–146)
POC TCO2: 26 MMOL/L (ref 24–29)
PROT UR QL STRIP: NEGATIVE
SP GR UR STRIP: 1.01 (ref 1–1.03)
UROBILINOGEN UR STRIP-ACNC: NEGATIVE (ref 0.1–1.1)

## 2017-11-02 PROCEDURE — 80047 BASIC METABLC PNL IONIZED CA: CPT | Mod: QW,S$GLB,, | Performed by: INTERNAL MEDICINE

## 2017-11-02 PROCEDURE — 99213 OFFICE O/P EST LOW 20 MIN: CPT | Mod: 25,S$GLB,, | Performed by: INTERNAL MEDICINE

## 2017-11-02 PROCEDURE — S0119 ONDANSETRON 4 MG: HCPCS | Mod: S$GLB,,, | Performed by: INTERNAL MEDICINE

## 2017-11-02 PROCEDURE — 81003 URINALYSIS AUTO W/O SCOPE: CPT | Mod: QW,S$GLB,, | Performed by: INTERNAL MEDICINE

## 2017-11-02 PROCEDURE — 81025 URINE PREGNANCY TEST: CPT | Mod: S$GLB,,, | Performed by: INTERNAL MEDICINE

## 2017-11-02 RX ORDER — ONDANSETRON 4 MG/1
4 TABLET, ORALLY DISINTEGRATING ORAL
Status: COMPLETED | OUTPATIENT
Start: 2017-11-02 | End: 2017-11-02

## 2017-11-02 RX ORDER — ONDANSETRON 4 MG/1
4 TABLET, FILM COATED ORAL EVERY 8 HOURS PRN
Qty: 20 TABLET | Refills: 0 | Status: SHIPPED | OUTPATIENT
Start: 2017-11-02 | End: 2018-04-27 | Stop reason: SDUPTHER

## 2017-11-02 RX ORDER — LOSARTAN POTASSIUM 25 MG/1
25 TABLET ORAL DAILY
COMMUNITY

## 2017-11-02 RX ADMIN — ONDANSETRON 4 MG: 4 TABLET, ORALLY DISINTEGRATING ORAL at 06:11

## 2017-11-02 NOTE — PROGRESS NOTES
"Subjective:       Patient ID: Kaylee Lefort is a 26 y.o. female.    Vitals:  height is 5' 5" (1.651 m) and weight is 61.2 kg (135 lb). Her temperature is 97.9 °F (36.6 °C). Her blood pressure is 120/78 and her pulse is 80. Her oxygen saturation is 99%.     Chief Complaint: Nausea    Nausea   This is a new problem. The current episode started yesterday. The problem occurs constantly. The problem has been gradually worsening. Associated symptoms include coughing, fatigue and nausea. Pertinent negatives include no abdominal pain, chest pain, chills, fever, headaches, rash, sore throat or vomiting. The symptoms are aggravated by coughing, drinking and eating. She has tried nothing for the symptoms. The treatment provided no relief.     Review of Systems   Constitution: Positive for fatigue. Negative for chills and fever.   HENT: Negative for sore throat.    Eyes: Negative for blurred vision.   Cardiovascular: Negative for chest pain.   Respiratory: Positive for cough. Negative for shortness of breath.    Skin: Negative for rash.   Musculoskeletal: Negative for back pain and joint pain.   Gastrointestinal: Positive for nausea. Negative for abdominal pain, diarrhea and vomiting.   Neurological: Negative for headaches.   Psychiatric/Behavioral: The patient is not nervous/anxious.        Objective:      Physical Exam    Assessment:       1. Weakness    2. Nausea    3. Cough        Plan:         Weakness  -     POCT Urinalysis, Dipstick, Automated, W/O Scope  -     POCT urine pregnancy  -     POCT Chemistry Panel  -     X-Ray Chest PA And Lateral; Future; Expected date: 11/02/2017    Nausea  -     POCT Urinalysis, Dipstick, Automated, W/O Scope  -     POCT urine pregnancy  -     POCT Chemistry Panel  -     ondansetron (ZOFRAN, AS HYDROCHLORIDE,) 4 MG tablet; Take 1 tablet (4 mg total) by mouth every 8 (eight) hours as needed for Nausea.  Dispense: 20 tablet; Refill: 0  -     ondansetron disintegrating tablet 4 mg; Take 1 " tablet (4 mg total) by mouth one time.    Cough  -     X-Ray Chest PA And Lateral; Future; Expected date: 11/02/2017

## 2018-04-27 ENCOUNTER — HOSPITAL ENCOUNTER (EMERGENCY)
Facility: HOSPITAL | Age: 28
Discharge: HOME | End: 2018-04-27
Attending: INTERNAL MEDICINE
Payer: COMMERCIAL

## 2018-04-27 VITALS
SYSTOLIC BLOOD PRESSURE: 141 MMHG | HEART RATE: 91 BPM | DIASTOLIC BLOOD PRESSURE: 80 MMHG | TEMPERATURE: 98 F | OXYGEN SATURATION: 100 % | BODY MASS INDEX: 23.32 KG/M2 | HEIGHT: 65 IN | WEIGHT: 140 LBS | RESPIRATION RATE: 18 BRPM

## 2018-04-27 DIAGNOSIS — R11.10 VOMITING, INTRACTABILITY OF VOMITING NOT SPECIFIED, PRESENCE OF NAUSEA NOT SPECIFIED, UNSPECIFIED VOMITING TYPE: Primary | ICD-10-CM

## 2018-04-27 LAB
B-HCG UR QL: NEGATIVE
BILIRUBIN, POC UA: NEGATIVE
BLOOD, POC UA: NEGATIVE
CLARITY, POC UA: CLEAR
COLOR, POC UA: YELLOW
CTP QC/QA: YES
GLUCOSE, POC UA: NEGATIVE
KETONES, POC UA: NEGATIVE
LEUKOCYTE EST, POC UA: NEGATIVE
NITRITE, POC UA: NEGATIVE
PH UR STRIP: 6.5 [PH]
PROTEIN, POC UA: NEGATIVE
SPECIFIC GRAVITY, POC UA: 1.02
UROBILINOGEN, POC UA: 0.2 E.U./DL

## 2018-04-27 PROCEDURE — 81025 URINE PREGNANCY TEST: CPT | Performed by: INTERNAL MEDICINE

## 2018-04-27 PROCEDURE — 99283 EMERGENCY DEPT VISIT LOW MDM: CPT

## 2018-04-27 PROCEDURE — 25000003 PHARM REV CODE 250: Performed by: INTERNAL MEDICINE

## 2018-04-27 PROCEDURE — 81003 URINALYSIS AUTO W/O SCOPE: CPT

## 2018-04-27 PROCEDURE — 84702 CHORIONIC GONADOTROPIN TEST: CPT

## 2018-04-27 RX ORDER — ONDANSETRON 4 MG/1
8 TABLET, ORALLY DISINTEGRATING ORAL
Status: COMPLETED | OUTPATIENT
Start: 2018-04-27 | End: 2018-04-27

## 2018-04-27 RX ORDER — ONDANSETRON 4 MG/1
4 TABLET, FILM COATED ORAL EVERY 6 HOURS PRN
Qty: 12 TABLET | Refills: 0 | Status: SHIPPED | OUTPATIENT
Start: 2018-04-27

## 2018-04-27 RX ADMIN — ONDANSETRON 8 MG: 4 TABLET, ORALLY DISINTEGRATING ORAL at 09:04

## 2018-04-28 NOTE — ED NOTES
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

## 2018-04-28 NOTE — ED PROVIDER NOTES
Encounter Date: 4/27/2018       History     Chief Complaint   Patient presents with    Nausea     pt c/o nausea with vomiting x1 episode today x2 days, denies abd pain, dysuria, back pain or other symptoms.        27-year-old female presents to the emergency department complaining of nausea and vomiting x2 days.  She reports 1 episode of vomiting today.  She denies fever/chills. She also states that she took a pregnancy test several days ago which was positive, but has had multiple negative pregnancy tests subsequently after the positive test.  Her last  Menstrual cycle was 3 months ago and she has not been using birth control except for the use of a Plan B several weeks ago.      The history is provided by the patient. No  was used.   Emesis    This is a new problem. The current episode started yesterday. The problem occurs intermittently. The problem has been unchanged. The emesis has an appearance of stomach contents. Pertinent negatives include no chills and no fever.     Review of patient's allergies indicates:   Allergen Reactions    Clindamycin Rash     Past Medical History:   Diagnosis Date    Hypertension     Thyroid disease      Past Surgical History:   Procedure Laterality Date    ABDOMINAL SURGERY      BACK SURGERY      TONSILLECTOMY       History reviewed. No pertinent family history.  Social History   Substance Use Topics    Smoking status: Never Smoker    Smokeless tobacco: Never Used    Alcohol use No     Review of Systems   Constitutional: Negative for chills and fever.   Respiratory: Negative.    Gastrointestinal: Positive for nausea and vomiting.   All other systems reviewed and are negative.      Physical Exam     Initial Vitals [04/27/18 2019]   BP Pulse Resp Temp SpO2   135/82 98 16 97.9 °F (36.6 °C) 100 %      MAP       99.67         Physical Exam    Nursing note and vitals reviewed.  Constitutional: She appears well-developed and well-nourished. No distress.    HENT:   Head: Normocephalic and atraumatic.   Right Ear: External ear normal.   Left Ear: External ear normal.   Eyes: Conjunctivae and EOM are normal.   Neck: Normal range of motion.   Cardiovascular: Normal rate and regular rhythm.   Pulmonary/Chest: Breath sounds normal. No respiratory distress.   Abdominal: Soft. Bowel sounds are normal. She exhibits no distension. There is no tenderness.   Musculoskeletal: Normal range of motion.   Neurological: She is alert. She has normal strength.   Skin: Skin is warm and dry.   Psychiatric: She has a normal mood and affect. Thought content normal.         ED Course   Procedures  Labs Reviewed   POCT URINE PREGNANCY   POCT URINALYSIS W/O SCOPE             Medical Decision Making:   Initial Assessment:   27-year-old female presents to the emergency department complaining of nausea and vomiting x2 days.  She reports 1 episode of vomiting today.  She denies fever/chills. She also states that she took a pregnancy test several days ago which was positive, but has had multiple negative pregnancy tests subsequently after the positive test.  Her last  Menstrual cycle was 3 months ago and she has not been using birth control except for the use of a Plan B several weeks ago.      ED Management:   Urinalysis and UPT were obtained  and were within normal limits.   Quantitative beta hCG was ordered , was negative  and the patient was given Zofran for nausea. She was advised to follow up with her primary care physician within the next 3 days for re-evaluation..                      Clinical Impression:   The encounter diagnosis was Vomiting, intractability of vomiting not specified, presence of nausea not specified, unspecified vomiting type.    Disposition:   Disposition: Discharged  Condition: Stable                        Demetrio Petersen MD  04/27/18 6913

## 2018-05-01 LAB
POC BETA-HCG (QUANT): <5 IU/L
SAMPLE: NORMAL

## 2018-05-06 ENCOUNTER — HOSPITAL ENCOUNTER (EMERGENCY)
Facility: HOSPITAL | Age: 28
Discharge: HOME | End: 2018-05-06
Attending: EMERGENCY MEDICINE
Payer: COMMERCIAL

## 2018-05-06 ENCOUNTER — NURSE TRIAGE (OUTPATIENT)
Dept: ADMINISTRATIVE | Facility: CLINIC | Age: 28
End: 2018-05-06

## 2018-05-06 VITALS
WEIGHT: 135 LBS | BODY MASS INDEX: 22.49 KG/M2 | HEIGHT: 65 IN | DIASTOLIC BLOOD PRESSURE: 54 MMHG | TEMPERATURE: 98 F | OXYGEN SATURATION: 100 % | HEART RATE: 83 BPM | SYSTOLIC BLOOD PRESSURE: 95 MMHG | RESPIRATION RATE: 14 BRPM

## 2018-05-06 DIAGNOSIS — N83.209 CYST OF OVARY, UNSPECIFIED LATERALITY: ICD-10-CM

## 2018-05-06 DIAGNOSIS — R10.9 ABDOMINAL PAIN: ICD-10-CM

## 2018-05-06 DIAGNOSIS — K59.00 CONSTIPATION: Primary | ICD-10-CM

## 2018-05-06 LAB
ALBUMIN SERPL BCP-MCNC: 4 G/DL
ALP SERPL-CCNC: 87 U/L
ALT SERPL W/O P-5'-P-CCNC: 13 U/L
ANION GAP SERPL CALC-SCNC: 9 MMOL/L
AST SERPL-CCNC: 15 U/L
B-HCG UR QL: NEGATIVE
BACTERIA #/AREA URNS AUTO: ABNORMAL /HPF
BASOPHILS # BLD AUTO: 0.02 K/UL
BASOPHILS NFR BLD: 0.1 %
BILIRUB SERPL-MCNC: 1.1 MG/DL
BILIRUB UR QL STRIP: NEGATIVE
BUN SERPL-MCNC: 10 MG/DL
CALCIUM SERPL-MCNC: 10.2 MG/DL
CHLORIDE SERPL-SCNC: 105 MMOL/L
CLARITY UR REFRACT.AUTO: ABNORMAL
CO2 SERPL-SCNC: 23 MMOL/L
COLOR UR AUTO: YELLOW
CREAT SERPL-MCNC: 0.8 MG/DL
CTP QC/QA: YES
DIFFERENTIAL METHOD: ABNORMAL
EOSINOPHIL # BLD AUTO: 0 K/UL
EOSINOPHIL NFR BLD: 0.1 %
ERYTHROCYTE [DISTWIDTH] IN BLOOD BY AUTOMATED COUNT: 13.4 %
EST. GFR  (AFRICAN AMERICAN): >60 ML/MIN/1.73 M^2
EST. GFR  (NON AFRICAN AMERICAN): >60 ML/MIN/1.73 M^2
GLUCOSE SERPL-MCNC: 91 MG/DL
GLUCOSE UR QL STRIP: NEGATIVE
HCT VFR BLD AUTO: 39.8 %
HGB BLD-MCNC: 13.5 G/DL
HGB UR QL STRIP: ABNORMAL
IMM GRANULOCYTES # BLD AUTO: 0.07 K/UL
IMM GRANULOCYTES NFR BLD AUTO: 0.5 %
KETONES UR QL STRIP: NEGATIVE
LEUKOCYTE ESTERASE UR QL STRIP: ABNORMAL
LYMPHOCYTES # BLD AUTO: 1 K/UL
LYMPHOCYTES NFR BLD: 6.8 %
MCH RBC QN AUTO: 31.3 PG
MCHC RBC AUTO-ENTMCNC: 33.9 G/DL
MCV RBC AUTO: 92 FL
MICROSCOPIC COMMENT: ABNORMAL
MONOCYTES # BLD AUTO: 2.1 K/UL
MONOCYTES NFR BLD: 14.3 %
NEUTROPHILS # BLD AUTO: 11.4 K/UL
NEUTROPHILS NFR BLD: 78.2 %
NITRITE UR QL STRIP: NEGATIVE
NRBC BLD-RTO: 0 /100 WBC
PH UR STRIP: 7 [PH] (ref 5–8)
PLATELET # BLD AUTO: 255 K/UL
PMV BLD AUTO: 10.3 FL
POTASSIUM SERPL-SCNC: 3.7 MMOL/L
PROT SERPL-MCNC: 7.9 G/DL
PROT UR QL STRIP: NEGATIVE
RBC # BLD AUTO: 4.32 M/UL
RBC #/AREA URNS AUTO: 3 /HPF (ref 0–4)
SODIUM SERPL-SCNC: 137 MMOL/L
SP GR UR STRIP: 1.01 (ref 1–1.03)
SQUAMOUS #/AREA URNS AUTO: 6 /HPF
URN SPEC COLLECT METH UR: ABNORMAL
UROBILINOGEN UR STRIP-ACNC: NEGATIVE EU/DL
WBC # BLD AUTO: 14.61 K/UL
WBC #/AREA URNS AUTO: 66 /HPF (ref 0–5)

## 2018-05-06 PROCEDURE — 87086 URINE CULTURE/COLONY COUNT: CPT

## 2018-05-06 PROCEDURE — 96360 HYDRATION IV INFUSION INIT: CPT

## 2018-05-06 PROCEDURE — 80053 COMPREHEN METABOLIC PANEL: CPT

## 2018-05-06 PROCEDURE — 81025 URINE PREGNANCY TEST: CPT | Performed by: EMERGENCY MEDICINE

## 2018-05-06 PROCEDURE — 99283 EMERGENCY DEPT VISIT LOW MDM: CPT | Mod: ,,, | Performed by: EMERGENCY MEDICINE

## 2018-05-06 PROCEDURE — 85025 COMPLETE CBC W/AUTO DIFF WBC: CPT

## 2018-05-06 PROCEDURE — 99284 EMERGENCY DEPT VISIT MOD MDM: CPT | Mod: 25

## 2018-05-06 PROCEDURE — 87088 URINE BACTERIA CULTURE: CPT

## 2018-05-06 PROCEDURE — 25000003 PHARM REV CODE 250: Performed by: EMERGENCY MEDICINE

## 2018-05-06 PROCEDURE — 81001 URINALYSIS AUTO W/SCOPE: CPT

## 2018-05-06 PROCEDURE — 87077 CULTURE AEROBIC IDENTIFY: CPT

## 2018-05-06 PROCEDURE — 25500020 PHARM REV CODE 255: Performed by: EMERGENCY MEDICINE

## 2018-05-06 PROCEDURE — 87186 SC STD MICRODIL/AGAR DIL: CPT

## 2018-05-06 RX ORDER — POLYETHYLENE GLYCOL 3350 17 G/17G
17 POWDER, FOR SOLUTION ORAL DAILY
Qty: 1 BOTTLE | Refills: 0 | OUTPATIENT
Start: 2018-05-06 | End: 2020-03-15

## 2018-05-06 RX ADMIN — SODIUM CHLORIDE 1000 ML: 0.9 INJECTION, SOLUTION INTRAVENOUS at 12:05

## 2018-05-06 RX ADMIN — IOHEXOL 75 ML: 350 INJECTION, SOLUTION INTRAVENOUS at 02:05

## 2018-05-06 NOTE — ED TRIAGE NOTES
Presents to ER with nausea, vomiting and abdominal cramping since last pm.  States that she has not been able to have a BM without the use of laxatives over the past month.  Pt identifiers checked and correct    LOC:   · The pt is awake, alert, and aware of environment with an appropriate affect,  as well as speaking appropriately; AAOx3 to person, place, and situation  APPEARANCE:   · Pt resting comfortably and in no acute distress; clean and well groomed  SKIN:   · Skin is warm and dry; color consistent with ethnicity; pt has normal skin turgor and moist mucus membranes  · Skin intact w/ no breakdown or brusing noted  MUSCULOSKELETAL:   · Pt moving all extremities well; absent of obvious deformities; Ambulates independently  RESPIRATORY:   · Airway is open and patent; respirations are spontaneous; normal effort and rate noted; absent of accessory-muscle use  · Breath sounds auscultated in all lung fields are noted to be clear and absent of adventitious sounds  CARDIAC:   · Pt denies chest pain; normal heart sounds auscultated; Pt has no peripheral edema noted; capillary refill < 3 seconds  · 2+ pulses palpated in all extremities   ABDOMEN:   · Soft and non-tender to palpation; no abnormal distention noted/reported; bowel sounds present x 4  NEUROLOGIC:   · PERRL, 3mm bilaterally, eyes open spontaneously; pt follows commands; facial expression symmetrical; equal hand  bilaterally; purposeful motor response noted  · Normal sensation to touch reported in distal region of all extremities

## 2018-05-06 NOTE — ED PROVIDER NOTES
SCRIBE #1 NOTE: Yeimy VANG, am scribing for, and in the presence of, Dr. Eckert.       CC: Abdominal Pain      HPI: Kaylee Lefort is a 27 y.o. year old female who presents to the ED complaining of constipation x 1 month.   Pt states that she hasn't had a full bowel movement without a stool softener or enema in about a month. Last bowel movement was 4 days ago, after having taken a stool softener and eating prunes. Pt endorses abdominal pain, chills, nausea since yesterday. Denies fever, vomiting. Patient took Tylenol this morning about 2 hours ago. Patient states that nothing in her diet or activity has changed in the last month. Patient was seen in the ED on 4/27/2018 for similar symptoms.         Past Medical History:   Diagnosis Date    Hypertension     Thyroid disease      Past Surgical History:   Procedure Laterality Date    ABDOMINAL SURGERY      BACK SURGERY      TONSILLECTOMY       History reviewed. No pertinent family history.  No current facility-administered medications on file prior to encounter.      Current Outpatient Prescriptions on File Prior to Encounter   Medication Sig Dispense Refill    levothyroxine (SYNTHROID) 100 MCG tablet Take 100 mcg by mouth once daily.      losartan (COZAAR) 25 MG tablet Take 25 mg by mouth once daily.      acetaminophen (TYLENOL) 500 MG tablet Take 2 tablets (1,000 mg total) by mouth 3 (three) times daily as needed for Pain. 30 tablet 0    amlodipine (NORVASC) 5 MG tablet Take 5 mg by mouth once daily.      diphenhydrAMINE (BENADRYL) 25 mg capsule Take 1 each (25 mg total) by mouth every 6 (six) hours as needed for Itching or Allergies. 20 capsule 0    ibuprofen (ADVIL,MOTRIN) 200 MG tablet Take 2 tablets (400 mg total) by mouth every 6 (six) hours as needed for Pain. 30 tablet 0    L norgest/e.estradiol-e.estrad (AMETHIA) 0.15 mg-30 mcg (84)/10 mcg (7) 3MPk Take by mouth.      loratadine (CLARITIN) 10 mg tablet Take 1 tablet (10 mg total) by mouth  once daily. 60 tablet 0    naproxen (EC-NAPROSYN) 375 MG TbEC EC tablet Take 375 mg by mouth 2 (two) times daily.      ondansetron (ZOFRAN) 4 MG tablet Take 1 tablet (4 mg total) by mouth every 6 (six) hours as needed for Nausea (for nausea). 12 tablet 0     Clindamycin  Social History     Social History    Marital status: Single     Spouse name: N/A    Number of children: N/A    Years of education: N/A     Social History Main Topics    Smoking status: Never Smoker    Smokeless tobacco: Never Used    Alcohol use No    Drug use: No    Sexual activity: Not Currently     Other Topics Concern    None     Social History Narrative    None       ROS:     Constitutional : Positive for chills   HEENT neg  Resp neg  Cardiac  neg  GI Abdominal pain, constipation, nausea    neg  Neuro neg  Heme/Immune: neg  Endo neg  Skin neg    PHYSICAL EXAM:  Vitals:    05/06/18 1143   BP: 119/64   Pulse: 97   Resp: 18   Temp: 99.3 °F (37.4 °C)         PHYSICAL EXAM:   general: comfortable, in no acute distress  VS: triage VS reviewed  HEENT: NC/AT, PERRL, EOMI  Neck: trachea midline  CV: RRR, no m/r/g, no LE pitting edema  Resp: CTAB  ABD:  ND, + normal BS, mild llq ttp  Renal: No CVAT  Neuro: AAO x 3, 5/5 muscle strength in upper and lower extremities, sensation grossly intact, face symmetric, speech normal  Ext: no deformity, +2 symmetrical peripheral pulses          DATA & INTERVENTIONS:    LABS reviewed:  Labs Reviewed   URINALYSIS, REFLEX TO URINE CULTURE - Abnormal; Notable for the following:        Result Value    Appearance, UA Hazy (*)     Occult Blood UA 1+ (*)     Leukocytes, UA 3+ (*)     All other components within normal limits    Narrative:     Preferred Collection Type->Urine, Clean Catch   COMPREHENSIVE METABOLIC PANEL - Abnormal; Notable for the following:     Total Bilirubin 1.1 (*)     All other components within normal limits   CBC W/ AUTO DIFFERENTIAL - Abnormal; Notable for the following:     WBC 14.61  (*)     MCH 31.3 (*)     All other components within normal limits   URINALYSIS MICROSCOPIC - Abnormal; Notable for the following:     WBC, UA 66 (*)     Bacteria, UA Many (*)     All other components within normal limits    Narrative:     Preferred Collection Type->Urine, Clean Catch   CULTURE, URINE   POCT URINE PREGNANCY       RADIOLOGY reviewed:  Imaging Results          X-Ray Abdomen Flat And Erect (Final result)  Result time 05/06/18 12:39:57    Final result by Vikki Salgado MD (05/06/18 12:39:57)                 Impression:      As above.      Electronically signed by: Vikki Salgado MD  Date:    05/06/2018  Time:    12:39             Narrative:    EXAMINATION:  XR ABDOMEN FLAT AND ERECT    CLINICAL HISTORY:  Unspecified abdominal pain    TECHNIQUE:  Flat and erect AP views of the abdomen were performed.    COMPARISON:  None    FINDINGS:  Spinal hardware is again identified overlying the visualized thoracic and lumbar spine with bilateral Alves rods.  Rounded metallic density overlying the left pelvis is presumably external to the patient.  No free air or small bowel air-fluid levels are identified on the upright image.  There is mild diffuse gaseous distension of the large and small bowel.  Rounded calcifications overlying the left pelvis are most likely phleboliths.  There is a lumbar levoscoliosis.                                MEDICATIONS/FLUIDS:  Medications   sodium chloride 0.9% bolus 1,000 mL (1,000 mLs Intravenous New Bag 5/6/18 1222)         MDM:   28 yo F w/ constipation x 1 month, tried stool softeners and laxatives, last night with chills, + mild llq ttp on palpation    Will check cmp/cbc, abd xr  u preg  ua    Wbc elevated at 14.6  UA +3 leuks, no UTI symptoms, no antibiotics started, U cs pending  LFT wnl  xr no sbo, no severe constipation    CT a/p 1. Cystic structure within the right adnexa, suggesting right ovarian cyst.  Given its configuration and surrounding fluid, this  could reflect ruptured hemorrhagic cyst, correlation with any pain in the region recommended.  Ultrasound could provide additional information as warranted.  No findings to suggest appendicitis.  2. Moderate stool in the right colon, could reflect developing constipation  A low attenuating lesion arises from the interpolar region of the left kidney, measuring 1 cm, too small for characterization: patient aware of the finding, has hx of renal cyst    patient d/c on PEG for constipation. No RLQ abd pain     The patient has been carefully educated about symptoms and conditions that should prompt immediate return to the ED for recheck or further evaluation. Told to return immediately for any new or worsening or progressive symptoms. In addition, patient told to return to the ED if they are unable to obtain adequate o/p follow-up as they have been directed. Patient expressed good undertanding of these instructions.          IMPRESSION:  1.) Constipation  2.) Right ovarian cyst    Dispo: Discharge    Critical Care Time: N/A     Yeimy VANG, moiz scribing for, and in the presence of, Dr. Eckert. I performed the above scribed service and the documentation accurately describes the services I performed. I attest to the accuracy of the note.       Pooja Eckert MD  05/06/18 0512

## 2018-05-07 NOTE — TELEPHONE ENCOUNTER
"ED 5/6  Friend called re flu like sx, chills.     Reason for Disposition   [1] MILD-MODERATE pain AND [2] constant AND [3] present > 2 hours    Answer Assessment - Initial Assessment Questions  1. LOCATION: "Where does it hurt?"      Pain bellow belly button 101 ax   2. RADIATION: "Does the pain shoot anywhere else?" (e.g., chest, back)      No   3. ONSET: "When did the pain begin?" (e.g., minutes, hours or days ago)      Flu like sx started yest. Fever started yest   4. SUDDEN: "Gradual or sudden onset?"     Ruptured ovarian cyst in ED  5. PATTERN "Does the pain come and go, or is it constant?"  Constant since yest      - If constant: "Is it getting better, staying the same, or worsening?"       (Note: Constant means the pain never goes away completely; most serious pain is constant and it progresses)      - If intermittent: "How long does it last?" "Do you have pain now?"      (Note: Intermittent means the pain goes away completely between bouts)     Pain same   6. SEVERITY: "How bad is the pain?"  (e.g., Scale 1-10; mild, moderate, or severe)    - MILD (1-3): doesn't interfere with normal activities, abdomen soft and not tender to touch     - MODERATE (4-7): interferes with normal activities or awakens from sleep, tender to touch     - SEVERE (8-10): excruciating pain, doubled over, unable to do any normal activities     7 had tylenol this evening, had ibuprofen   7. RECURRENT SYMPTOM: "Have you ever had this type of abdominal pain before?" If so, ask: "When was the last time?" and "What happened that time?"      No   8. CAUSE: "What do you think is causing the abdominal pain?"     Ruptured cyst   9. RELIEVING/AGGRAVATING FACTORS: "What makes it better or worse?" (e.g., movement, antacids, bowel movement)     Constipated , no flatus, drank smoothie , + nausea   10. OTHER SYMPTOMS: "Has there been any vomiting, diarrhea, constipation, or urine problems?"      No vomiting   11. PREGNANCY: "Is there any chance you " "are pregnant?" "When was your last menstrual period?"    lmp  Since late jan    Protocols used: ST ABDOMINAL PAIN - FEMALE-A-AH  last uop - got IV in ED   rec ED due to fever, constant abd pain since yest. Nausea, no flatus. Friend states that he will talk with pt about going back to the ED. Call back with questions.   "

## 2018-05-08 ENCOUNTER — NURSE TRIAGE (OUTPATIENT)
Dept: ADMINISTRATIVE | Facility: CLINIC | Age: 28
End: 2018-05-08

## 2018-05-08 ENCOUNTER — HOSPITAL ENCOUNTER (EMERGENCY)
Facility: HOSPITAL | Age: 28
Discharge: HOME | End: 2018-05-09
Attending: EMERGENCY MEDICINE
Payer: COMMERCIAL

## 2018-05-08 VITALS
OXYGEN SATURATION: 100 % | HEART RATE: 65 BPM | HEIGHT: 65 IN | TEMPERATURE: 98 F | BODY MASS INDEX: 23.32 KG/M2 | SYSTOLIC BLOOD PRESSURE: 96 MMHG | DIASTOLIC BLOOD PRESSURE: 52 MMHG | WEIGHT: 140 LBS | RESPIRATION RATE: 18 BRPM

## 2018-05-08 DIAGNOSIS — K59.00 CONSTIPATION, UNSPECIFIED CONSTIPATION TYPE: ICD-10-CM

## 2018-05-08 DIAGNOSIS — R10.9 FLANK PAIN: Primary | ICD-10-CM

## 2018-05-08 DIAGNOSIS — N30.00 ACUTE CYSTITIS WITHOUT HEMATURIA: ICD-10-CM

## 2018-05-08 LAB
ALBUMIN SERPL BCP-MCNC: 3.5 G/DL
ALLENS TEST: ABNORMAL
ALP SERPL-CCNC: 88 U/L
ALT SERPL W/O P-5'-P-CCNC: 12 U/L
ANION GAP SERPL CALC-SCNC: 9 MMOL/L
AST SERPL-CCNC: 16 U/L
B-HCG UR QL: NEGATIVE
BACTERIA #/AREA URNS AUTO: ABNORMAL /HPF
BACTERIA UR CULT: NORMAL
BASOPHILS # BLD AUTO: 0.05 K/UL
BASOPHILS NFR BLD: 0.4 %
BILIRUB SERPL-MCNC: 0.3 MG/DL
BILIRUB UR QL STRIP: NEGATIVE
BUN SERPL-MCNC: 10 MG/DL
CALCIUM SERPL-MCNC: 9.5 MG/DL
CHLORIDE SERPL-SCNC: 106 MMOL/L
CLARITY UR REFRACT.AUTO: CLEAR
CO2 SERPL-SCNC: 22 MMOL/L
COLOR UR AUTO: YELLOW
CREAT SERPL-MCNC: 0.7 MG/DL
CTP QC/QA: YES
DELSYS: ABNORMAL
DIFFERENTIAL METHOD: ABNORMAL
EOSINOPHIL # BLD AUTO: 0.1 K/UL
EOSINOPHIL NFR BLD: 1.2 %
ERYTHROCYTE [DISTWIDTH] IN BLOOD BY AUTOMATED COUNT: 13.9 %
EST. GFR  (AFRICAN AMERICAN): >60 ML/MIN/1.73 M^2
EST. GFR  (NON AFRICAN AMERICAN): >60 ML/MIN/1.73 M^2
GLUCOSE SERPL-MCNC: 82 MG/DL
GLUCOSE UR QL STRIP: NEGATIVE
HCO3 UR-SCNC: 24.2 MMOL/L (ref 24–28)
HCT VFR BLD AUTO: 35.6 %
HGB BLD-MCNC: 11.8 G/DL
HGB UR QL STRIP: NEGATIVE
IMM GRANULOCYTES # BLD AUTO: 0.05 K/UL
IMM GRANULOCYTES NFR BLD AUTO: 0.4 %
KETONES UR QL STRIP: NEGATIVE
LDH SERPL L TO P-CCNC: 1.13 MMOL/L (ref 0.5–2.2)
LEUKOCYTE ESTERASE UR QL STRIP: ABNORMAL
LIPASE SERPL-CCNC: 11 U/L
LYMPHOCYTES # BLD AUTO: 2.1 K/UL
LYMPHOCYTES NFR BLD: 17.8 %
MCH RBC QN AUTO: 31.3 PG
MCHC RBC AUTO-ENTMCNC: 33.1 G/DL
MCV RBC AUTO: 94 FL
MICROSCOPIC COMMENT: ABNORMAL
MODE: ABNORMAL
MONOCYTES # BLD AUTO: 2 K/UL
MONOCYTES NFR BLD: 17 %
NEUTROPHILS # BLD AUTO: 7.3 K/UL
NEUTROPHILS NFR BLD: 63.2 %
NITRITE UR QL STRIP: NEGATIVE
NON-SQ EPI CELLS #/AREA URNS AUTO: <1 /HPF
NRBC BLD-RTO: 0 /100 WBC
PCO2 BLDA: 41.5 MMHG (ref 35–45)
PH SMN: 7.37 [PH] (ref 7.35–7.45)
PH UR STRIP: 6 [PH] (ref 5–8)
PLATELET # BLD AUTO: 257 K/UL
PMV BLD AUTO: 10.3 FL
PO2 BLDA: 36 MMHG (ref 40–60)
POC BE: -1 MMOL/L
POC SATURATED O2: 68 % (ref 95–100)
POC TCO2: 25 MMOL/L (ref 24–29)
POTASSIUM SERPL-SCNC: 3.5 MMOL/L
PROT SERPL-MCNC: 7.6 G/DL
PROT UR QL STRIP: NEGATIVE
RBC # BLD AUTO: 3.77 M/UL
RBC #/AREA URNS AUTO: 2 /HPF (ref 0–4)
SAMPLE: ABNORMAL
SITE: ABNORMAL
SODIUM SERPL-SCNC: 137 MMOL/L
SP GR UR STRIP: 1.01 (ref 1–1.03)
SQUAMOUS #/AREA URNS AUTO: 7 /HPF
URN SPEC COLLECT METH UR: ABNORMAL
UROBILINOGEN UR STRIP-ACNC: NEGATIVE EU/DL
WBC # BLD AUTO: 11.5 K/UL
WBC #/AREA URNS AUTO: 11 /HPF (ref 0–5)

## 2018-05-08 PROCEDURE — 63600175 PHARM REV CODE 636 W HCPCS: Performed by: EMERGENCY MEDICINE

## 2018-05-08 PROCEDURE — 81025 URINE PREGNANCY TEST: CPT | Performed by: EMERGENCY MEDICINE

## 2018-05-08 PROCEDURE — 80053 COMPREHEN METABOLIC PANEL: CPT

## 2018-05-08 PROCEDURE — 25000003 PHARM REV CODE 250: Performed by: EMERGENCY MEDICINE

## 2018-05-08 PROCEDURE — 99284 EMERGENCY DEPT VISIT MOD MDM: CPT | Mod: ,,, | Performed by: EMERGENCY MEDICINE

## 2018-05-08 PROCEDURE — 82803 BLOOD GASES ANY COMBINATION: CPT

## 2018-05-08 PROCEDURE — 96361 HYDRATE IV INFUSION ADD-ON: CPT

## 2018-05-08 PROCEDURE — 81001 URINALYSIS AUTO W/SCOPE: CPT

## 2018-05-08 PROCEDURE — 85025 COMPLETE CBC W/AUTO DIFF WBC: CPT

## 2018-05-08 PROCEDURE — 83605 ASSAY OF LACTIC ACID: CPT

## 2018-05-08 PROCEDURE — 99284 EMERGENCY DEPT VISIT MOD MDM: CPT | Mod: 25

## 2018-05-08 PROCEDURE — 83690 ASSAY OF LIPASE: CPT

## 2018-05-08 PROCEDURE — 96374 THER/PROPH/DIAG INJ IV PUSH: CPT

## 2018-05-08 PROCEDURE — 99900035 HC TECH TIME PER 15 MIN (STAT)

## 2018-05-08 RX ORDER — SYRING-NEEDL,DISP,INSUL,0.3 ML 29 G X1/2"
296 SYRINGE, EMPTY DISPOSABLE MISCELLANEOUS
Status: COMPLETED | OUTPATIENT
Start: 2018-05-09 | End: 2018-05-09

## 2018-05-08 RX ORDER — KETOROLAC TROMETHAMINE 30 MG/ML
15 INJECTION, SOLUTION INTRAMUSCULAR; INTRAVENOUS
Status: COMPLETED | OUTPATIENT
Start: 2018-05-08 | End: 2018-05-08

## 2018-05-08 RX ADMIN — SODIUM CHLORIDE 1000 ML: 0.9 INJECTION, SOLUTION INTRAVENOUS at 10:05

## 2018-05-08 RX ADMIN — KETOROLAC TROMETHAMINE 15 MG: 30 INJECTION, SOLUTION INTRAMUSCULAR at 10:05

## 2018-05-09 PROCEDURE — 25000003 PHARM REV CODE 250: Performed by: EMERGENCY MEDICINE

## 2018-05-09 PROCEDURE — 87086 URINE CULTURE/COLONY COUNT: CPT

## 2018-05-09 RX ADMIN — MAGNESIUM CITRATE 296 ML: 1.75 LIQUID ORAL at 12:05

## 2018-05-09 NOTE — ED TRIAGE NOTES
Pt reports she has had back pain for the past few days. Pt states she had an ovarian cyst which ruptured. Pt reprots going to the clinic and also being told she has a uti today. Pt reports left sided flank pain has worsened.

## 2018-05-09 NOTE — ED NOTES
Two patient identifiers checked and confirmed.    APPEARANCE: Resting comfortably in no acute distress. Patient has clean hair, skin and nails. Clothing is appropriate and properly fastened.  NEURO: Awake, alert, appropriate for age, and cooperative with a calm affect; pupils equal and round. Oriented x4.  HEENT: Head symmetrical. Bilateral eyes without redness or drainage.  CARDIAC: Regular rate and rhythm. S1, S2 noted.  RESPIRATORY: Airway is open and patent. Respirations are spontaneous on room air, even and unlabored. Normal respiratory effort and rate noted.   GI/: Abdomen soft and non-distended. Patient is reported to void and stool appropriately for age. Pt reports left sided flank pain.  NEUROVASCULAR: All extremities are warm and pink with +2 pulses and capillary refill less than 3 seconds. No peripheral edema noted.  MUSCULOSKELETAL: Moves all extremities well; no obvious deformities noted. Pt ambulated independently with steady gait.   SKIN: Warm, dry, and intact. Mucus membranes moist and pink.   PSYCH: Pt has calm affect, appropriate speech and thought process.

## 2018-05-09 NOTE — ED PROVIDER NOTES
Encounter Date: 5/8/2018    SCRIBE #1 NOTE: I, Gina Cochran, am scribing for, and in the presence of,  Dr. Villarrela. I have scribed the entire note.       History     Chief Complaint   Patient presents with    Back Pain     pain to the left side. primarily back x 3 days.pt is aox 4.pt denies chest pain sob or nausea. pt states she was recently diagnosed with ruprued ovarian cysts.      Time patient was seen by the provider: 10:22 PM      The patient is a 27 y.o. female with co-morbidities including: HTN and PSHx of Nissen fundoplication who presents to the ED with a complaint of constant left flank pain that radiates to her abdomen and back. Pt was seen in ED on May 6th for right flank pain and had a CT scan that showed a ruptured right ovarian hemorrhagic cyst and UA concerning for UTI but pt was asymptomatic at the time and not started on antibiotics. She was then started on keflex by her PCP on May 7th. She was called today for urine culture obtained on 6th which showed pan sensitive E. coli. Pt came into ED today because of now left flank pain. These symptoms are different than the previous pain. No fevers, N/V, or chills. She additionally endorses constipation despite taking Miralax and has not yet had a BM. Pt denies n/v and vaginal discharge. Pt has taken ibuprofen with last dose at 5:30 PM with no relief.       The history is provided by the patient and medical records.     Review of patient's allergies indicates:   Allergen Reactions    Clindamycin Rash     Past Medical History:   Diagnosis Date    Hypertension     Thyroid disease      Past Surgical History:   Procedure Laterality Date    ABDOMINAL SURGERY      BACK SURGERY      TONSILLECTOMY       History reviewed. No pertinent family history.  Social History   Substance Use Topics    Smoking status: Never Smoker    Smokeless tobacco: Never Used    Alcohol use No     Review of Systems   Constitutional: Negative for chills and fever.   HENT:  Negative for congestion.    Eyes: Negative for pain.   Respiratory: Negative for shortness of breath.    Cardiovascular: Negative for chest pain.   Gastrointestinal: Positive for abdominal pain and constipation. Negative for nausea and vomiting.   Genitourinary: Positive for flank pain (left). Negative for vaginal discharge.   Musculoskeletal: Positive for back pain.   Skin: Negative for rash.   Neurological: Negative for headaches.       Physical Exam     Initial Vitals [05/08/18 2148]   BP Pulse Resp Temp SpO2   (!) 100/50 60 20 98.2 °F (36.8 °C) 97 %      MAP       66.67         Physical Exam    Nursing note and vitals reviewed.  Constitutional: She appears well-developed and well-nourished. She is not diaphoretic. No distress.   HENT:   Head: Normocephalic and atraumatic.   Eyes: Conjunctivae and EOM are normal.   Neck: Normal range of motion. Neck supple. No JVD present.   Cardiovascular: Normal rate, regular rhythm, normal heart sounds and intact distal pulses. Exam reveals no gallop and no friction rub.    No murmur heard.  Pulmonary/Chest: Breath sounds normal. No respiratory distress. She has no wheezes. She has no rhonchi. She has no rales. She exhibits no tenderness.   Abdominal: Soft. Bowel sounds are normal. She exhibits no distension and no mass. There is tenderness (LUQ). There is no rebound and no guarding.   Musculoskeletal: Normal range of motion.   No CVA tenderness bilaterally.   Lymphadenopathy:     She has no cervical adenopathy.   Neurological: She is alert and oriented to person, place, and time. She has normal strength. No cranial nerve deficit or sensory deficit.   Skin: Skin is warm and dry. Capillary refill takes 2 to 3 seconds.   Psychiatric: She has a normal mood and affect.         ED Course   Procedures  Labs Reviewed   URINALYSIS, REFLEX TO URINE CULTURE - Abnormal; Notable for the following:        Result Value    Leukocytes, UA 2+ (*)     All other components within normal limits     Narrative:     Preferred Collection Type->Urine, Clean Catch   CBC W/ AUTO DIFFERENTIAL - Abnormal; Notable for the following:     RBC 3.77 (*)     Hemoglobin 11.8 (*)     Hematocrit 35.6 (*)     MCH 31.3 (*)     Immature Grans (Abs) 0.05 (*)     Mono # 2.0 (*)     Lymph% 17.8 (*)     Mono% 17.0 (*)     All other components within normal limits   COMPREHENSIVE METABOLIC PANEL - Abnormal; Notable for the following:     CO2 22 (*)     All other components within normal limits   URINALYSIS MICROSCOPIC - Abnormal; Notable for the following:     WBC, UA 11 (*)     Bacteria, UA Few (*)     All other components within normal limits    Narrative:     Preferred Collection Type->Urine, Clean Catch   ISTAT PROCEDURE - Abnormal; Notable for the following:     POC PO2 36 (*)     POC SATURATED O2 68 (*)     All other components within normal limits   CULTURE, URINE   LIPASE   POCT URINE PREGNANCY             Medical Decision Making:   History:   Old Medical Records: I decided to obtain old medical records.  Initial Assessment:   27 y.o. female with HTN and thyroid disease presenting with left flank pain. Pt had CT scan two days ago which was negative for nephrolithiasis. She had a ruptured right ovarian cyst. Pyelonephritis is possibility but pt has been compliant with keflex and urine culture that is susceptible to keflex. My initial differential diagnoses include but are not limited to constipation and muscular strain. Will administer IV fluids, NSAID's and will obtain repeat serum and urine labs.    Reassessment:  Lactate at 1, UA improved from previous with few bacteria and 11 WBC's. Repeat culture sent. Previous culture susceptible to keflex. Labs reveal no leukocytosis. Hemoglobin did drop to 11.8 likely 2/2 ruptured cyst. CMP and lipase within normal limits.     Reassessment:  Repeat blood pressure 96/52. She is not tachycardic. Pt remains asymptomatic. She is not septic. She is mentating well. I advised her the  importance of PO hydration but she does not wish to stay for additional IV fluids. Given the improvement in her urine I do not feel this flank pain to be due to pyelonephritis. Overall it is concerning for persistent constipation. Will discharge with mag citrate and strict return precautions.  Clinical Tests:   Lab Tests: Ordered and Reviewed            Scribe Attestation:   Scribe #1: I performed the above scribed service and the documentation accurately describes the services I performed. I attest to the accuracy of the note.    Attending Attestation:           Physician Attestation for Scribe:      Comments: I, Dr. Aaron Villarreal, personally performed the services described in this documentation. All medical record entries made by the scribe were at my direction and in my presence.  I have reviewed the chart and agree that the record reflects my personal performance and is accurate and complete. Aaron Villarreal MD.  12:16 AM 05/09/2018                 Clinical Impression:   The primary encounter diagnosis was Flank pain. Diagnoses of Constipation, unspecified constipation type and Acute cystitis without hematuria were also pertinent to this visit.    Disposition:   Disposition: Discharged  Condition: Stable                        Aaron Villarreal MD  05/09/18 0017

## 2018-05-10 LAB — BACTERIA UR CULT: NO GROWTH

## 2018-08-24 ENCOUNTER — OFFICE VISIT (OUTPATIENT)
Dept: URGENT CARE | Facility: CLINIC | Age: 28
End: 2018-08-24
Payer: COMMERCIAL

## 2018-08-24 VITALS
BODY MASS INDEX: 22.82 KG/M2 | WEIGHT: 137 LBS | HEART RATE: 84 BPM | RESPIRATION RATE: 18 BRPM | OXYGEN SATURATION: 100 % | DIASTOLIC BLOOD PRESSURE: 84 MMHG | TEMPERATURE: 98 F | SYSTOLIC BLOOD PRESSURE: 126 MMHG | HEIGHT: 65 IN

## 2018-08-24 DIAGNOSIS — J01.41 ACUTE RECURRENT PANSINUSITIS: Primary | ICD-10-CM

## 2018-08-24 PROCEDURE — 3008F BODY MASS INDEX DOCD: CPT | Mod: CPTII,S$GLB,, | Performed by: FAMILY MEDICINE

## 2018-08-24 PROCEDURE — 99214 OFFICE O/P EST MOD 30 MIN: CPT | Mod: S$GLB,,, | Performed by: FAMILY MEDICINE

## 2018-08-24 RX ORDER — PREDNISONE 20 MG/1
40 TABLET ORAL DAILY
Qty: 6 TABLET | Refills: 0 | Status: SHIPPED | OUTPATIENT
Start: 2018-08-24 | End: 2018-08-27

## 2018-08-25 NOTE — PROGRESS NOTES
"Subjective:       Patient ID: Kaylee Lefort is a 27 y.o. female.    Vitals:  height is 5' 5" (1.651 m) and weight is 62.1 kg (137 lb). Her oral temperature is 97.6 °F (36.4 °C). Her blood pressure is 126/84 and her pulse is 84. Her respiration is 18 and oxygen saturation is 100%.     Chief Complaint: Sinus Problem    This is a 27 y.o. female who presents today with a chief complaint of sinus problems x2 days, congestion bothering her the most. No fever. Always happens around this time of year. She has been taking tylenol cold and sinus.      Sinus Problem   This is a new problem. The current episode started yesterday. The problem has been gradually worsening since onset. There has been no fever. Her pain is at a severity of 10/10. The pain is moderate. Associated symptoms include congestion, coughing, headaches, a hoarse voice, sinus pressure and sneezing. Pertinent negatives include no chills, ear pain, shortness of breath or sore throat. Past treatments include acetaminophen (cold and sinus). The treatment provided no relief.     Review of Systems   Constitution: Positive for malaise/fatigue. Negative for chills and fever.   HENT: Positive for congestion, hoarse voice, sinus pressure and sneezing. Negative for ear pain and sore throat.    Eyes: Positive for discharge (watery). Negative for redness.   Cardiovascular: Negative for chest pain, dyspnea on exertion and leg swelling.   Respiratory: Positive for cough. Negative for shortness of breath, sputum production and wheezing.    Musculoskeletal: Negative for myalgias.   Gastrointestinal: Negative for abdominal pain and nausea.   Neurological: Positive for headaches.       Objective:      Physical Exam   Constitutional: She is oriented to person, place, and time. She appears well-developed and well-nourished. She is cooperative.  Non-toxic appearance. She does not appear ill. No distress.   HENT:   Head: Normocephalic and atraumatic.   Right Ear: Hearing, tympanic " membrane, external ear and ear canal normal. No middle ear effusion.   Left Ear: Hearing, tympanic membrane, external ear and ear canal normal.   Nose: Mucosal edema and rhinorrhea present. No nasal deformity. No epistaxis. Right sinus exhibits maxillary sinus tenderness and frontal sinus tenderness. Left sinus exhibits maxillary sinus tenderness and frontal sinus tenderness.   Mouth/Throat: Uvula is midline and mucous membranes are normal. No trismus in the jaw. Normal dentition. No uvula swelling. Posterior oropharyngeal erythema present. No oropharyngeal exudate.   Eyes: Conjunctivae and lids are normal. No scleral icterus.   Sclera clear bilat   Neck: Trachea normal, full passive range of motion without pain and phonation normal. Neck supple.   Cardiovascular: Normal rate, regular rhythm, normal heart sounds, intact distal pulses and normal pulses.   Pulmonary/Chest: Effort normal and breath sounds normal. No respiratory distress. She has no wheezes.   Abdominal: Soft. Normal appearance and bowel sounds are normal. She exhibits no distension. There is no tenderness.   Musculoskeletal: Normal range of motion. She exhibits no edema or deformity.   Lymphadenopathy:     She has no cervical adenopathy.   Neurological: She is alert and oriented to person, place, and time. She exhibits normal muscle tone. Coordination normal.   Skin: Skin is warm, dry and intact. She is not diaphoretic. No pallor.   Psychiatric: She has a normal mood and affect. Her speech is normal and behavior is normal. Judgment and thought content normal. Cognition and memory are normal.   Nursing note and vitals reviewed.      Assessment:       1. Acute recurrent pansinusitis        Plan:         Acute recurrent pansinusitis  -     predniSONE (DELTASONE) 20 MG tablet; Take 2 tablets (40 mg total) by mouth once daily. for 3 days  Dispense: 6 tablet; Refill: 0      Patient Instructions     PLEASE READ YOUR DISCHARGE INSTRUCTIONS ENTIRELY AS IT  CONTAINS IMPORTANT INFORMATION.    Try over the counter afrin, but for NO LONGER than 3 days as it can cause rebound congestion. Then you can switch to flonase if you find the nasal sprays are working well.     If you find this dries your nose out or your nose bleeds, try using over the counter nasal saline a few minutes prior to using the flonase to moisten the lining of your nose and throughout the day as needed.     Please take an over the counter antihistamine medication (allegra/Claritin/Zyrtec) of your choice as directed and mucinex-D or continue the tylenol cold and sinus (if it gives you funny heartbeats you can switch to regular mucinex).     You can try breathe right strips at night to help you breathe.  A cool mist humidifier in bedroom may help with cough and relieve stuffy nose.     Sore throat recommendations: Warm fluids, warm salt water gargles, throat lozenges, tea, honey, soup, rest, hydration.     Sinus rinses DO NOT USE TAP WATER, if you must, water must be a rolling boil for 1 minute, let it cool, then use.  May use distilled water, or over the counter nasal saline rinses.  Vics vapor rub in shower to help open nasal passages.  May use nasal gel to keep passages moisturized.  May use Nasal saline sprays during the day for added relief of congestion.   For those who go to the gym, please do not use the sauna or steam room now to clear sinuses.    During pollen season, change shirt if you are outside for a while when you go in.  Also wash your face.  Do not touch your face with your hands.  Wash your hands often in general while ill, avoid face contact with hands. Good nutrition. Lots of rest. Plenty of fluids    Over the counter you can use Tylenol (acetominophen) or Ibuprofen for your minor aches and pains as long as you have no contraindications.        Please return or see your primary care doctor if you develop new or worsening symptoms.     You must understand that you have received an  Urgent Care treatment only and that you may be released before all of your medical problems are known or treated.                Sinusitis (No Antibiotics)    The sinuses are air-filled spaces within the bones of the face. They connect to the inside of the nose. Sinusitis is an inflammation of the tissue lining the sinus cavity. Sinus inflammation can occur during a cold. It can also be due to allergies to pollens and other particles in the air. It can cause symptoms such as sinus congestion, headache, sore throat, facial swelling and fullness. It may also cause a low-grade fever. No infection is present, and no antibiotic treatment is needed.  Home care  · Drink plenty of water, hot tea, and other liquids. This may help thin mucus. It also may promote sinus drainage.  · Heat may help soothe painful areas of the face. Use a towel soaked in hot water. Or,  the shower and direct the hot spray onto your face. Using a vaporizer along with a menthol rub at night may also help.   · An expectorant containing guaifenesin may help thin the mucus and promote drainage from the sinuses.  · Over-the-counter decongestants may be used unless a similar medicine was prescribed. Nasal sprays work the fastest. Use one that contains phenylephrine or oxymetazoline. First blow the nose gently. Then use the spray. Do not use these medicines more often than directed on the label or symptoms may get worse. You may also use tablets containing pseudoephedrine. Avoid products that combine ingredients, because side effects may be increased. Read labels. You can also ask the pharmacist for help. (NOTE: Persons with high blood pressure should not use decongestants. They can raise blood pressure.)  · Over-the-counter antihistamines may help if allergies contributed to your sinusitis.    · Use acetaminophen or ibuprofen to control pain, unless another pain medicine was prescribed. (If you have chronic liver or kidney disease or ever had a  stomach ulcer, talk with your doctor before using these medicines. Aspirin should never be used in anyone under 18 years of age who is ill with a fever. It may cause severe liver damage.)  · Use nasal rinses or irrigation as instructed by your health care provider.  · Don't smoke. This can worsen symptoms.  Follow-up care  Follow up with your healthcare provider or our staff if you are not improving within the next week.  When to seek medical advice  Call your healthcare provider if any of these occur:  · Green or yellow discharge from the nose or into the throat  · Facial pain or headache becoming more severe  · Stiff neck  · Unusual drowsiness or confusion  · Swelling of the forehead or eyelids  · Vision problems, including blurred or double vision  · Fever of 100.4ºF (38ºC) or higher, or as directed by your healthcare provider  · Seizure  · Breathing problems  · Symptoms not resolving within 10 days  Date Last Reviewed: 4/13/2015  © 7915-3969 [employer] [address] [place] [address]. All rights reserved. This information is not intended as a substitute for professional medical care. Always follow your healthcare professional's instructions.        Self-Care for Sinusitis     Drinking plenty of water can help sinuses drain.   Sinusitis can often be managed with self-care. Self-care can keep sinuses moist and make you feel more comfortable. Remember to follow your doctor's instructions closely. This can make a big difference in getting your sinus problem under control.  Drink fluids  Drinking extra fluids helps thin your mucus. This lets it drain from your sinuses more easily. Have a glass of water every hour or two. A humidifier helps in much the same way. Fluids can also offset the drying effects of certain medicines. If you use a humidifier, follow the product maker's instructions on how to use it. Clean it on a regular schedule.  Use saltwater rinses  Rinses help keep your sinuses  and nose moist. Mix a teaspoon of salt in 8 ounces of fresh, warm water. Use a bulb syringe to gently squirt the water into your nose a few times a day. You can also buy ready-made saline nasal sprays.  Apply hot or cold packs  Applying heat to the area surrounding your sinuses may make you feel more comfortable. Use a hot water bottle or a hand towel dipped in hot water. Some people also find ice packs effective for relieving pain.  Medicines  Your doctor may prescribe medications to help treat your sinusitis. If you have an infection, antibiotics can help clear it up. If you are prescribed antibiotics, take all pills on schedule until they are gone, even if you feel better. Decongestants help relieve swelling. Use decongestant sprays for short periods only under the direction of your doctor. If you have allergies, your doctor may prescribe medications to help relieve them.   Date Last Reviewed: 10/1/2016  © 9665-6115 [employer]. [address] [place] [address], [place] [address]. All rights reserved. This information is not intended as a substitute for professional medical care. Always follow your healthcare professional's instructions.

## 2018-08-25 NOTE — PATIENT INSTRUCTIONS
PLEASE READ YOUR DISCHARGE INSTRUCTIONS ENTIRELY AS IT CONTAINS IMPORTANT INFORMATION.    Try over the counter afrin, but for NO LONGER than 3 days as it can cause rebound congestion. Then you can switch to flonase if you find the nasal sprays are working well.     If you find this dries your nose out or your nose bleeds, try using over the counter nasal saline a few minutes prior to using the flonase to moisten the lining of your nose and throughout the day as needed.     Please take an over the counter antihistamine medication (allegra/Claritin/Zyrtec) of your choice as directed and mucinex-D or continue the tylenol cold and sinus (if it gives you funny heartbeats you can switch to regular mucinex).     You can try breathe right strips at night to help you breathe.  A cool mist humidifier in bedroom may help with cough and relieve stuffy nose.     Sore throat recommendations: Warm fluids, warm salt water gargles, throat lozenges, tea, honey, soup, rest, hydration.     Sinus rinses DO NOT USE TAP WATER, if you must, water must be a rolling boil for 1 minute, let it cool, then use.  May use distilled water, or over the counter nasal saline rinses.  Vics vapor rub in shower to help open nasal passages.  May use nasal gel to keep passages moisturized.  May use Nasal saline sprays during the day for added relief of congestion.   For those who go to the gym, please do not use the sauna or steam room now to clear sinuses.    During pollen season, change shirt if you are outside for a while when you go in.  Also wash your face.  Do not touch your face with your hands.  Wash your hands often in general while ill, avoid face contact with hands. Good nutrition. Lots of rest. Plenty of fluids    Over the counter you can use Tylenol (acetominophen) or Ibuprofen for your minor aches and pains as long as you have no contraindications.        Please return or see your primary care doctor if you develop new or worsening symptoms.      You must understand that you have received an Urgent Care treatment only and that you may be released before all of your medical problems are known or treated.                Sinusitis (No Antibiotics)    The sinuses are air-filled spaces within the bones of the face. They connect to the inside of the nose. Sinusitis is an inflammation of the tissue lining the sinus cavity. Sinus inflammation can occur during a cold. It can also be due to allergies to pollens and other particles in the air. It can cause symptoms such as sinus congestion, headache, sore throat, facial swelling and fullness. It may also cause a low-grade fever. No infection is present, and no antibiotic treatment is needed.  Home care  · Drink plenty of water, hot tea, and other liquids. This may help thin mucus. It also may promote sinus drainage.  · Heat may help soothe painful areas of the face. Use a towel soaked in hot water. Or,  the shower and direct the hot spray onto your face. Using a vaporizer along with a menthol rub at night may also help.   · An expectorant containing guaifenesin may help thin the mucus and promote drainage from the sinuses.  · Over-the-counter decongestants may be used unless a similar medicine was prescribed. Nasal sprays work the fastest. Use one that contains phenylephrine or oxymetazoline. First blow the nose gently. Then use the spray. Do not use these medicines more often than directed on the label or symptoms may get worse. You may also use tablets containing pseudoephedrine. Avoid products that combine ingredients, because side effects may be increased. Read labels. You can also ask the pharmacist for help. (NOTE: Persons with high blood pressure should not use decongestants. They can raise blood pressure.)  · Over-the-counter antihistamines may help if allergies contributed to your sinusitis.    · Use acetaminophen or ibuprofen to control pain, unless another pain medicine was prescribed. (If you  have chronic liver or kidney disease or ever had a stomach ulcer, talk with your doctor before using these medicines. Aspirin should never be used in anyone under 18 years of age who is ill with a fever. It may cause severe liver damage.)  · Use nasal rinses or irrigation as instructed by your health care provider.  · Don't smoke. This can worsen symptoms.  Follow-up care  Follow up with your healthcare provider or our staff if you are not improving within the next week.  When to seek medical advice  Call your healthcare provider if any of these occur:  · Green or yellow discharge from the nose or into the throat  · Facial pain or headache becoming more severe  · Stiff neck  · Unusual drowsiness or confusion  · Swelling of the forehead or eyelids  · Vision problems, including blurred or double vision  · Fever of 100.4ºF (38ºC) or higher, or as directed by your healthcare provider  · Seizure  · Breathing problems  · Symptoms not resolving within 10 days  Date Last Reviewed: 4/13/2015  © 8041-0921 [employer]. [address] [place] [address]. All rights reserved. This information is not intended as a substitute for professional medical care. Always follow your healthcare professional's instructions.        Self-Care for Sinusitis     Drinking plenty of water can help sinuses drain.   Sinusitis can often be managed with self-care. Self-care can keep sinuses moist and make you feel more comfortable. Remember to follow your doctor's instructions closely. This can make a big difference in getting your sinus problem under control.  Drink fluids  Drinking extra fluids helps thin your mucus. This lets it drain from your sinuses more easily. Have a glass of water every hour or two. A humidifier helps in much the same way. Fluids can also offset the drying effects of certain medicines. If you use a humidifier, follow the product maker's instructions on how to use it. Clean it on a regular schedule.  Use  saltwater rinses  Rinses help keep your sinuses and nose moist. Mix a teaspoon of salt in 8 ounces of fresh, warm water. Use a bulb syringe to gently squirt the water into your nose a few times a day. You can also buy ready-made saline nasal sprays.  Apply hot or cold packs  Applying heat to the area surrounding your sinuses may make you feel more comfortable. Use a hot water bottle or a hand towel dipped in hot water. Some people also find ice packs effective for relieving pain.  Medicines  Your doctor may prescribe medications to help treat your sinusitis. If you have an infection, antibiotics can help clear it up. If you are prescribed antibiotics, take all pills on schedule until they are gone, even if you feel better. Decongestants help relieve swelling. Use decongestant sprays for short periods only under the direction of your doctor. If you have allergies, your doctor may prescribe medications to help relieve them.   Date Last Reviewed: 10/1/2016  © 9394-4142 [employer]. [address] [place] [address], [place] [address]. All rights reserved. This information is not intended as a substitute for professional medical care. Always follow your healthcare professional's instructions.

## 2020-03-15 ENCOUNTER — HOSPITAL ENCOUNTER (EMERGENCY)
Facility: HOSPITAL | Age: 30
Discharge: HOME | End: 2020-03-15
Attending: EMERGENCY MEDICINE
Payer: COMMERCIAL

## 2020-03-15 VITALS
HEIGHT: 65 IN | OXYGEN SATURATION: 97 % | SYSTOLIC BLOOD PRESSURE: 118 MMHG | RESPIRATION RATE: 18 BRPM | DIASTOLIC BLOOD PRESSURE: 78 MMHG | TEMPERATURE: 98 F | BODY MASS INDEX: 21.66 KG/M2 | HEART RATE: 89 BPM | WEIGHT: 130 LBS

## 2020-03-15 DIAGNOSIS — R10.31 RLQ ABDOMINAL PAIN: Primary | ICD-10-CM

## 2020-03-15 DIAGNOSIS — K59.00 CONSTIPATION, UNSPECIFIED CONSTIPATION TYPE: ICD-10-CM

## 2020-03-15 LAB
ALBUMIN SERPL-MCNC: 3.9 G/DL (ref 3.3–5.5)
ALBUMIN SERPL-MCNC: 4.1 G/DL (ref 3.3–5.5)
ALP SERPL-CCNC: 82 U/L (ref 42–141)
ALP SERPL-CCNC: 85 U/L (ref 42–141)
B-HCG UR QL: NEGATIVE
BILIRUB SERPL-MCNC: 0.7 MG/DL (ref 0.2–1.6)
BILIRUB SERPL-MCNC: 0.8 MG/DL (ref 0.2–1.6)
BILIRUBIN, POC UA: NEGATIVE
BLOOD, POC UA: NEGATIVE
BUN SERPL-MCNC: 16 MG/DL (ref 7–22)
CALCIUM SERPL-MCNC: 9.8 MG/DL (ref 8–10.3)
CHLORIDE SERPL-SCNC: 106 MMOL/L (ref 98–108)
CLARITY, POC UA: CLEAR
COLOR, POC UA: YELLOW
CREAT SERPL-MCNC: 0.8 MG/DL (ref 0.6–1.2)
CTP QC/QA: YES
GLUCOSE SERPL-MCNC: 96 MG/DL (ref 73–118)
GLUCOSE, POC UA: NEGATIVE
KETONES, POC UA: NEGATIVE
LEUKOCYTE EST, POC UA: ABNORMAL
NITRITE, POC UA: NEGATIVE
PH UR STRIP: 7 [PH]
POC ALT (SGPT): 10 U/L (ref 10–47)
POC ALT (SGPT): 11 U/L (ref 10–47)
POC AMYLASE: 29 U/L (ref 14–97)
POC AST (SGOT): 20 U/L (ref 11–38)
POC AST (SGOT): 21 U/L (ref 11–38)
POC GGT: 13 U/L (ref 5–65)
POC TCO2: 26 MMOL/L (ref 18–33)
POTASSIUM BLD-SCNC: 4.1 MMOL/L (ref 3.6–5.1)
PROTEIN, POC UA: NEGATIVE
PROTEIN, POC: 7.3 G/DL (ref 6.4–8.1)
PROTEIN, POC: 7.3 G/DL (ref 6.4–8.1)
SODIUM BLD-SCNC: 143 MMOL/L (ref 128–145)
SPECIFIC GRAVITY, POC UA: 1.02
UROBILINOGEN, POC UA: 1 E.U./DL

## 2020-03-15 PROCEDURE — 96374 THER/PROPH/DIAG INJ IV PUSH: CPT | Mod: ER,59

## 2020-03-15 PROCEDURE — 81003 URINALYSIS AUTO W/O SCOPE: CPT | Mod: ER

## 2020-03-15 PROCEDURE — 87481 CANDIDA DNA AMP PROBE: CPT | Mod: 59

## 2020-03-15 PROCEDURE — 25500020 PHARM REV CODE 255: Mod: ER | Performed by: PHYSICIAN ASSISTANT

## 2020-03-15 PROCEDURE — 87661 TRICHOMONAS VAGINALIS AMPLIF: CPT

## 2020-03-15 PROCEDURE — 96361 HYDRATE IV INFUSION ADD-ON: CPT | Mod: ER

## 2020-03-15 PROCEDURE — 99285 EMERGENCY DEPT VISIT HI MDM: CPT | Mod: 25,ER

## 2020-03-15 PROCEDURE — 85025 COMPLETE CBC W/AUTO DIFF WBC: CPT | Mod: ER

## 2020-03-15 PROCEDURE — 87491 CHLMYD TRACH DNA AMP PROBE: CPT | Mod: 59

## 2020-03-15 PROCEDURE — 63600175 PHARM REV CODE 636 W HCPCS: Mod: ER | Performed by: PHYSICIAN ASSISTANT

## 2020-03-15 PROCEDURE — 82150 ASSAY OF AMYLASE: CPT | Mod: ER

## 2020-03-15 PROCEDURE — 81025 URINE PREGNANCY TEST: CPT | Mod: ER | Performed by: EMERGENCY MEDICINE

## 2020-03-15 PROCEDURE — 80053 COMPREHEN METABOLIC PANEL: CPT | Mod: ER

## 2020-03-15 RX ORDER — IBUPROFEN 600 MG/1
600 TABLET ORAL EVERY 6 HOURS PRN
Qty: 20 TABLET | Refills: 0 | Status: SHIPPED | OUTPATIENT
Start: 2020-03-15 | End: 2020-03-20

## 2020-03-15 RX ORDER — KETOROLAC TROMETHAMINE 30 MG/ML
30 INJECTION, SOLUTION INTRAMUSCULAR; INTRAVENOUS
Status: COMPLETED | OUTPATIENT
Start: 2020-03-15 | End: 2020-03-15

## 2020-03-15 RX ORDER — POLYETHYLENE GLYCOL 3350 17 G/17G
17 POWDER, FOR SOLUTION ORAL DAILY
Qty: 14 EACH | Refills: 0 | Status: SHIPPED | OUTPATIENT
Start: 2020-03-15 | End: 2020-03-29

## 2020-03-15 RX ORDER — ACETAMINOPHEN 500 MG
500 TABLET ORAL EVERY 4 HOURS PRN
Qty: 20 TABLET | Refills: 0 | Status: SHIPPED | OUTPATIENT
Start: 2020-03-15 | End: 2020-03-20

## 2020-03-15 RX ADMIN — KETOROLAC TROMETHAMINE 30 MG: 30 INJECTION, SOLUTION INTRAMUSCULAR at 01:03

## 2020-03-15 RX ADMIN — SODIUM CHLORIDE 1000 ML: 0.9 INJECTION, SOLUTION INTRAVENOUS at 01:03

## 2020-03-15 RX ADMIN — IOHEXOL 75 ML: 350 INJECTION, SOLUTION INTRAVENOUS at 02:03

## 2020-03-15 NOTE — ED PROVIDER NOTES
Encounter Date: 3/15/2020    SCRIBE #1 NOTE: I, Jae Bolanos, am scribing for, and in the presence of,  MANSOOR Bernstein. I have scribed the following portions of the note - Other sections scribed: HPI, ROS, PE.       History     Chief Complaint   Patient presents with    Abdominal Pain     lower abd/pelvic pain, cramping, onset last night     29 year old female with constant right lower quadrant abdominal pain onset last night. States pain is 7/10, sometimes radiates towards left side, exacerbated with movement or deep breaths. Patient also complains of chills and light-headedness. Denies any fevers, nausea, vomiting, diarrhea, constipation, dysuria, frequency, vaginal bleeding, or vaginal discharge. Reports taking Ibuprofen and soaking in tub for her symptoms. Patient is 7 months post partum and has not started her cycle yet. Hx of Nissen surgery.    The history is provided by the patient. No  was used.     Review of patient's allergies indicates:   Allergen Reactions    Clindamycin Rash     Past Medical History:   Diagnosis Date    Hypertension     Thyroid disease      Past Surgical History:   Procedure Laterality Date    ABDOMINAL SURGERY      BACK SURGERY      TONSILLECTOMY       History reviewed. No pertinent family history.  Social History     Tobacco Use    Smoking status: Never Smoker    Smokeless tobacco: Never Used   Substance Use Topics    Alcohol use: No    Drug use: No     Review of Systems   Constitutional: Positive for chills. Negative for fever.   HENT: Negative for congestion, ear pain, rhinorrhea and sore throat.    Eyes: Negative for redness.   Respiratory: Negative for stridor.    Gastrointestinal: Positive for abdominal pain. Negative for constipation, diarrhea, nausea and vomiting.   Genitourinary: Negative for dysuria, frequency, hematuria, pelvic pain, urgency, vaginal bleeding and vaginal discharge.   Musculoskeletal: Negative for back pain and neck pain.    Skin: Negative for rash.   Neurological: Positive for light-headedness. Negative for speech difficulty.   Psychiatric/Behavioral: Negative for confusion.   All other systems reviewed and are negative.      Physical Exam     Initial Vitals [03/15/20 1250]   BP Pulse Resp Temp SpO2   107/65 98 19 98 °F (36.7 °C) 95 %      MAP       --         Physical Exam    Nursing note and vitals reviewed.  Constitutional: She appears well-developed and well-nourished.   HENT:   Head: Normocephalic and atraumatic.   Right Ear: External ear normal.   Left Ear: External ear normal.   Nose: Nose normal.   Mouth/Throat: Oropharynx is clear and moist.   Eyes: Conjunctivae are normal.   Neck: Normal range of motion. Neck supple.   Cardiovascular: Normal rate and regular rhythm. Exam reveals no gallop and no friction rub.    No murmur heard.  Pulmonary/Chest: Breath sounds normal. No respiratory distress. She has no wheezes. She has no rhonchi. She has no rales.   Abdominal: Soft. There is tenderness in the right lower quadrant. There is guarding and tenderness at McBurney's point. There is no rigidity, no rebound, no CVA tenderness and negative Pina's sign.   Genitourinary:   Genitourinary Comments: Chaperoned by jose craft RN: scant amount of thin whitish/yellow discharge in vaginal vault. No cervical friability. No CMT. No adnexal ttp or masses.    Musculoskeletal: Normal range of motion.   Neurological: She is alert and oriented to person, place, and time.   Skin: Skin is warm and dry.   Psychiatric: She has a normal mood and affect.         ED Course   Procedures  Labs Reviewed   POCT URINALYSIS W/O SCOPE - Abnormal; Notable for the following components:       Result Value    Leukocytes, UA Trace (*)     All other components within normal limits   C. TRACHOMATIS/N. GONORRHOEAE BY AMP DNA   VAGINOSIS SCREEN BY DNA PROBE   POCT URINE PREGNANCY   POCT URINALYSIS W/O SCOPE   POCT CBC   POCT CMP   POCT LIVER PANEL   POCT LIVER  PANEL   POCT CMP          Imaging Results          US Pelvis Comp with Transvag NON-OB (xpd) (Final result)  Result time 03/15/20 15:33:32   Procedure changed from US Transvaginal Non OB     Final result by Virginie Ramachandran MD (03/15/20 15:33:32)                 Impression:      Essentially normal examination with a small subendometrial cyst anteriorly measuring 4 mm.      Electronically signed by: Virginie Ramachandran MD  Date:    03/15/2020  Time:    15:33             Narrative:    EXAMINATION:  US PELVIS COMP WITH TRANSVAG NON-OB (XPD)    CLINICAL HISTORY:  rlq pain; Right lower quadrant pain    TECHNIQUE:  Transabdominal sonography of the pelvis was performed, followed by transvaginal sonography to better evaluate the uterus and ovaries.    COMPARISON:  None.    FINDINGS:  Uterus:    Size: 6.7 x 4.1 x 4.9 cm    Masses: Subendometrial cyst measures 4 mm.    Endometrium: Normal in this pre menopausal patient, measuring 6 mm.    Right ovary:    Size: 3.4 x 2.3 x 2.0 cm    Appearance: Normal    Vascular flow: Normal.    Left ovary:    Size: 4.0 x 2.4 x 3.2 cm    Appearance: Normal    Vascular Flow: Normal.    Free Fluid:    None.                               CT Abdomen Pelvis With Contrast (Final result)  Result time 03/15/20 14:12:51    Final result by Virginie Ramachandran MD (03/15/20 14:12:51)                 Impression:      Normal appendix.    Constipation      Electronically signed by: Virginie Ramachandran MD  Date:    03/15/2020  Time:    14:12             Narrative:    EXAMINATION:  CT ABDOMEN PELVIS WITH CONTRAST    CLINICAL HISTORY:  RLQ pain, appendicitis suspected;    TECHNIQUE:  Low dose axial images, sagittal and coronal reformations were obtained from the lung bases to the pubic symphysis following the IV administration of 75 mL of Omnipaque 350 .  Oral contrast was not given.    COMPARISON:  05/06/2018    FINDINGS:  Lung bases are clear.  The base of the heart appears.    Beam hardening artifact thoracolumbar  fusion hardware limits interpretation of the abdominal structures.  The aorta is of normal caliber and tapers appropriately.    No radiopaque gallstones are seen.  No intrahepatic or extrahepatic biliary ductal dilatation is identified.  The liver, spleen, pancreas, adrenal glands and kidneys are normal in size, shape and contour.  Suspected nonobstructing stone at the lower pole of the right kidney.  Left renal cyst.  No hydronephrosis or hydroureter is seen.  The urinary bladder is not well distended and could not be adequately evaluated.  Uterus and adnexal regions are unremarkable.    Constipation.  The appendix normal.  No free air, free fluid or obstruction.  No pathologically enlarged abdominal or pelvic lymph nodes are seen.    Skeletal structures are intact.                                 Medical Decision Making:   History:   Old Medical Records: I decided to obtain old medical records.  Initial Assessment:   29-year-old female with history of hypertension and thyroid disease presenting for evaluation of right lower quadrant abdominal pain since 02/30 this morning.  She denies fever associated symptoms.  Denies urinary symptoms, pelvic pain, vaginal discharge or concern for STI.  Patient is afebrile, nontoxic appearing in no distress.  Exam above.  Labs drawn.  CT abdomen pelvis remarkable for constipation.  Negative for appendicitis.  Toradol IV given with mild improvement. Still has RLQ tenderness.  Pelvic exam is negative for cervicitis, PID or TOA.  Ultrasound is negative for ovarian torsion.  Remarkable for 4 mm sub endometrial cyst.  Discussed findings with patient.  Considered but doubt acute surgical abdomen.  She is refusing any additional medication.  Will have her follow up with primary care and OBGYN for further evaluation management.  Return to the ER for worsening symptoms or as needed.    Clinical Tests:   Lab Tests: Ordered and Reviewed  The following lab test(s) were unremarkable:  UPT  Radiological Study: Ordered and Reviewed            Scribe Attestation:   Scribe #1: I performed the above scribed service and the documentation accurately describes the services I performed. I attest to the accuracy of the note.    Attending Attestation:           Physician Attestation for Scribe:  Physician Attestation Statement for Scribe #1: I, Flores Lemus PA-C, reviewed documentation, as scribed by Jae Bolanos in my presence, and it is both accurate and complete.                                Clinical Impression:     1. RLQ abdominal pain    2. Constipation, unspecified constipation type                ED Disposition Condition    Discharge Stable        ED Prescriptions     Medication Sig Dispense Start Date End Date Auth. Provider    ibuprofen (ADVIL,MOTRIN) 600 MG tablet Take 1 tablet (600 mg total) by mouth every 6 (six) hours as needed for Pain. 20 tablet 3/15/2020 3/20/2020 Flores Lemus PA-C    acetaminophen (TYLENOL) 500 MG tablet Take 1 tablet (500 mg total) by mouth every 4 (four) hours as needed. 20 tablet 3/15/2020 3/20/2020 Flores Lemus PA-C    polyethylene glycol (GLYCOLAX) 17 gram PwPk Take 17 g by mouth once daily. for 14 days 14 each 3/15/2020 3/29/2020 Flores Lemus PA-C        Follow-up Information     Follow up With Specialties Details Why Contact Info    [facility] See Family Medicine Schedule an appointment as soon as possible for a visit in 2 days for follow up [address]  [place] [address]  [phone]      [facility] Emergency Department Emergency Medicine Go to  As needed, If symptoms worsen [address]  [facility] [address] [phone]                                     Flores Lemus PA-C  03/15/20 2517

## 2020-03-15 NOTE — DISCHARGE INSTRUCTIONS
Take medications as prescribed for pain. Follow up with primary care and OBGYN in 1-2 days. Return to ER for worsening symptoms or as needed.

## 2020-03-16 LAB
C TRACH DNA SPEC QL NAA+PROBE: NOT DETECTED
N GONORRHOEA DNA SPEC QL NAA+PROBE: NOT DETECTED

## 2020-03-17 LAB
BACTERIAL VAGINOSIS DNA: NEGATIVE
CANDIDA GLABRATA DNA: NEGATIVE
CANDIDA KRUSEI DNA: NEGATIVE
CANDIDA RRNA VAG QL PROBE: NEGATIVE
T VAGINALIS RRNA GENITAL QL PROBE: NEGATIVE

## 2021-07-01 ENCOUNTER — PATIENT MESSAGE (OUTPATIENT)
Dept: ADMINISTRATIVE | Facility: OTHER | Age: 31
End: 2021-07-01

## 2022-01-19 ENCOUNTER — TELEPHONE (OUTPATIENT)
Dept: SURGERY | Facility: CLINIC | Age: 32
End: 2022-01-19
Payer: COMMERCIAL

## 2022-12-01 NOTE — ED AVS SNAPSHOT
[facility] EMERGENCY DEPARTMENT  [address] [facility] [address]               Kaylee Lefort   2017  6:32 PM   ED    Description:  Female : 1990   Department:  [facility] Emergency Department           Your Care was Coordinated By:     Provider Role From To    Naila Toscano DO Attending Provider [phone] --      Reason for Visit     Ankle Pain           Diagnoses this Visit        Comments    Cellulitis, unspecified cellulitis site    -  Primary       ED Disposition     None           To Do List           Follow-up Information     Follow up with SELVIN Rosa.    Specialty:  Family Medicine    Contact information:    [address]  [place] [id number] [phone]          Follow up with Maria Ibanez-Labadie, MD. Schedule an appointment as soon as possible for a visit in 1 day.    Specialty:  Dermatology    Contact information:    [address] [facility] [address]  [facility] [address]  [phone]          Follow up with [facility] Emergency Department In 2 days.    Specialty:  Emergency Medicine    Why:  For wound re-check    Contact information:    [address] [facility] [address]  [phone]       These Medications        Disp Refills Start End    clindamycin (CLEOCIN) 150 MG capsule 56 capsule 0 2017    Take 2 capsules (300 mg total) by mouth 4 (four) times daily. - Oral    diphenhydrAMINE (BENADRYL) 25 mg capsule 30 capsule 0 2017     Take 1 each (25 mg total) by mouth every 6 (six) hours as needed for Itching or Allergies. - Oral    loratadine (CLARITIN) 10 mg tablet 60 tablet 0 2017    Take 1 tablet (10 mg total) by mouth once daily. - Oral    naproxen (EC-NAPROSYN) 375 MG TbEC EC tablet 20 tablet 0 2017    Take 1 tablet (375 mg total) by mouth 2 (two) times daily as needed. Take with food 2 times a day. - Oral      [facility]     [facility] Care [facility] -  Assistance  Unless otherwise directed  Patient says she's coming no later than next week to complete A1c   by your provider, please contact [facility], our nurse care line that is available for 24/7 assistance.     Registered nurses in the [facility] provide: appointment scheduling, clinical advisement, health education, and other advisory services.  Call: [phone] (toll free)               Medications           Message regarding Medications     Verify the changes and/or additions to your medication regime listed below are the same as discussed with your clinician today.  If any of these changes or additions are incorrect, please notify your healthcare provider.        START taking these NEW medications        Refills    clindamycin (CLEOCIN) 150 MG capsule 0    Sig: Take 2 capsules (300 mg total) by mouth 4 (four) times daily.    Class: Print    Route: Oral    diphenhydrAMINE (BENADRYL) 25 mg capsule 0    Sig: Take 1 each (25 mg total) by mouth every 6 (six) hours as needed for Itching or Allergies.    Class: Print    Route: Oral    loratadine (CLARITIN) 10 mg tablet 0    Sig: Take 1 tablet (10 mg total) by mouth once daily.    Class: Print    Route: Oral    naproxen (EC-NAPROSYN) 375 MG TbEC EC tablet 0    Sig: Take 1 tablet (375 mg total) by mouth 2 (two) times daily as needed. Take with food 2 times a day.    Class: Print    Route: Oral      These medications were administered today        Dose Freq    clindamycin injection 600 mg 600 mg ED 1 Time    Sig: Inject 4 mLs (600 mg total) into the muscle ED 1 Time.    Class: Normal    Route: Intramuscular           Verify that the below list of medications is an accurate representation of the medications you are currently taking.  If none reported, the list may be blank. If incorrect, please contact your healthcare provider. Carry this list with you in case of emergency.           Current Medications     amlodipine (NORVASC) 5 MG tablet Take 5 mg by mouth once daily.    L norgest/e.estradiol-e.estrad (AMETHIA) 0.15 mg-30 mcg (84)/10 mcg (7) 3MPk Take  by mouth.    levothyroxine (SYNTHROID) 100 MCG tablet Take 100 mcg by mouth once daily.    acetaminophen (TYLENOL) 500 MG tablet Take 2 tablets (1,000 mg total) by mouth 3 (three) times daily as needed for Pain.    clindamycin (CLEOCIN) 150 MG capsule Take 2 capsules (300 mg total) by mouth 4 (four) times daily.    clindamycin injection 600 mg Inject 4 mLs (600 mg total) into the muscle ED 1 Time.    diphenhydrAMINE (BENADRYL) 25 mg capsule Take 1 each (25 mg total) by mouth every 6 (six) hours as needed for Itching or Allergies.    ibuprofen (ADVIL,MOTRIN) 200 MG tablet Take 2 tablets (400 mg total) by mouth every 6 (six) hours as needed for Pain.    indomethacin (INDOCIN) 50 MG capsule Take 1 capsule (50 mg total) by mouth 3 (three) times daily with meals.    loratadine (CLARITIN) 10 mg tablet Take 1 tablet (10 mg total) by mouth once daily.    naproxen (EC-NAPROSYN) 375 MG TbEC EC tablet Take 1 tablet (375 mg total) by mouth 2 (two) times daily as needed. Take with food 2 times a day.           Clinical Reference Information           Your Vitals Were     BP Pulse Temp Resp Weight SpO2    152/94 (BP Location: Left arm, Patient Position: Sitting, BP Method: Automatic) 98 98 °F (36.7 °C) (Tympanic) 18 59 kg (130 lb) 99%    BMI                21.63 kg/m2          Allergies as of 5/9/2017     No Known Allergies      Immunizations Administered on Date of Encounter - 5/9/2017     None      ED Micro, Lab, POCT     None      ED Imaging Orders     None        Discharge Instructions         Cellulitis  Cellulitis is an infection of the deep layers of skin. A break in the skin, such as a cut or scratch, can let bacteria under the skin. If the bacteria get to deep layers of the skin, it can be serious. If not treated, cellulitis can get into the bloodstream and lymph nodes. The infection can then spread throughout the body. This causes serious illness.  Cellulitis causes the affected skin to become red, swollen, warm, and  sore. The reddened areas have a visible border. An open sore may leak fluid (pus). You may have a fever, chills, and pain.  Cellulitis is treated with antibiotics taken for 7 to 10 days. An open sore may be cleaned and covered with cool wet gauze. Symptoms should get better 1 to 2 days after treatment is started. Make sure to take all the antibiotics for the full number of days until they are gone. Keep taking the medicine even if your symptoms go away.  Home care  Follow these tips:  · Limit the use of the part of your body with cellulitis.   · If the infection is on your leg, keep your leg raised while sitting. This will help to reduce swelling.  · Take all of the antibiotic medicine exactly as directed until it is gone. Do not miss any doses, especially during the first 7 days. Dont stop taking the medicine when your symptoms get better.  · Keep the affected area clean and dry.  · Wash your hands with soap and warm water before and after touching your skin. Anyone else who touches your skin should also wash his or her hands. Don't share towels.  Follow-up care  Follow up with your healthcare provider, or as advised. If your infection does not go away on the first antibiotic, your healthcare provider will prescribe a different one.  When to seek medical advice  Call your healthcare provider right away if any of these occur:  · Red areas that spread  · Swelling or pain that gets worse  · Fluid leaking from the skin (pus)  · Fever higher of 100.4º F (38.0º C) or higher after 2 days on antibiotics  Date Last Reviewed: 9/1/2016 © 2000-2016 [employer]. [address] [place] [address], [place] [address]. All rights reserved. This information is not intended as a substitute for professional medical care. Always follow your healthcare professional's instructions.          [facility] Sign-Up     Activating your [facility] account is as easy as 1-2-3!     1) Visit [email or web address], select Sign Up Now, enter this  activation code and your date of birth, then select Next.  [email or web address]  Expires: 6/23/2017  7:21 PM      2) Create a username and password to use when you visit [facility] in the future and select a security question in case you lose your password and select Next.    3) Enter your e-mail address and click Sign Up!    Additional Information  If you have questions, please e-mail [employer] [email or web address] or call [phone] to talk to our [facility] staff. Remember, [facility] is NOT to be used for urgent needs. For medical emergencies, dial 911.          [facility] Emergency Department complies with applicable Federal civil rights laws and does not discriminate on the basis of race, color, national origin, age, disability, or sex.        Language Assistance Services     ATTENTION: Language assistance services are available, free of charge. Please call [phone].      ATENCIÓN: Si habla español, tiene a chaparro disposición servicios gratuitos de asistencia lingüística. Llame al [phone].     CHÚ Ý: N?u b?n nói Ti?ng Vi?t, có các d?ch v? h? tr? ngôn ng? mi?n phí dành cho b?n. G?i s? [phone].

## 2024-01-31 ENCOUNTER — OFFICE VISIT (OUTPATIENT)
Dept: URGENT CARE | Facility: CLINIC | Age: 34
End: 2024-01-31
Payer: COMMERCIAL

## 2024-01-31 VITALS
WEIGHT: 160 LBS | SYSTOLIC BLOOD PRESSURE: 150 MMHG | OXYGEN SATURATION: 98 % | DIASTOLIC BLOOD PRESSURE: 97 MMHG | HEIGHT: 65 IN | HEART RATE: 89 BPM | RESPIRATION RATE: 18 BRPM | BODY MASS INDEX: 26.66 KG/M2 | TEMPERATURE: 99 F

## 2024-01-31 DIAGNOSIS — J02.9 VIRAL PHARYNGITIS: Primary | ICD-10-CM

## 2024-01-31 DIAGNOSIS — J02.9 SORE THROAT: ICD-10-CM

## 2024-01-31 LAB
CTP QC/QA: YES
CTP QC/QA: YES
MOLECULAR STREP A: NEGATIVE
POC MOLECULAR INFLUENZA A AGN: NEGATIVE
POC MOLECULAR INFLUENZA B AGN: NEGATIVE

## 2024-01-31 PROCEDURE — 99203 OFFICE O/P NEW LOW 30 MIN: CPT | Mod: S$GLB,,, | Performed by: FAMILY MEDICINE

## 2024-01-31 PROCEDURE — 87502 INFLUENZA DNA AMP PROBE: CPT | Mod: QW,S$GLB,, | Performed by: FAMILY MEDICINE

## 2024-01-31 PROCEDURE — 87651 STREP A DNA AMP PROBE: CPT | Mod: QW,S$GLB,, | Performed by: FAMILY MEDICINE

## 2024-02-01 NOTE — PATIENT INSTRUCTIONS
General Discharge Instructions   PLEASE READ YOUR DISCHARGE INSTRUCTIONS ENTIRELY AS IT CONTAINS IMPORTANT INFORMATION.  If you were prescribed a narcotic or controlled medication, do not drive or operate heavy equipment or machinery while taking these medications.  If you were prescribed antibiotics, please take them to completion.  You must understand that you've received an Urgent Care treatment only and that you may be released before all your medical problems are known or treated. You, the patient, will arrange for follow up care as instructed.    OVER THE COUNTER RECOMMENDATIONS/SUGGESTIONS.    Make sure to stay well hydrated.    Use Nasal Saline to mechanically move any post nasal drip from your eustachian tube or from the back of your throat.    Use warm salt water gargles to ease your throat pain. Warm salt water gargles as needed for sore throat- 1/2 tsp salt to 1 cup warm water, gargle as desired.    Use an antihistamine such as Claritin, Zyrtec or Allegra to dry you out.    Use pseudoephedrine (behind the counter) to decongest. Pseudoephedrine 30 mg up to 240 mg /day. It can raise your blood pressure and give you palpitations.    Use mucinex (guaifenesin) to break up mucous up to 2400mg/day to loosen any mucous.    The mucinex DM pill has a cough suppressant that can be sedating. It can be used at night to stop the tickle at the back of your throat.    You can use Mucinex D (it has guaifenesin and a high dose of pseudoephedrine) in the mornings to help decongest.    Use Afrin in each nare for no longer than 3 days, as it is addictive. It can also dry out your mucous membranes and cause elevated blood pressure. This is especially useful if you are flying.    Use Flonase 1-2 sprays/nostril per day. It is a local acting steroid nasal spray, if you develop a bloody nose, stop using the medication immediately.    Sometimes Nyquil at night is beneficial to help you get some rest, however it is sedating and it  does have an antihistamine, and tylenol.    Honey is a natural cough suppressant that can be used.    Tylenol up to 4,000 mg a day is safe for short periods and can be used for body aches, pain, and fever. However in high doses and prolonged use it can cause liver irritation.    Ibuprofen is a non-steroidal anti-inflammatory that can be used for body aches, pain, and fever.However it can also cause stomach irritation if over used.     Follow up with your PCP or specialty clinic as instructed in the next 2-3 days if not improved or as needed. You can call [phone] to schedule an appointment with appropriate provider.      If you condition worsens, we recommend that you receive another evaluation at the emergency room immediately or contact your primary medical clinic's after hours call service to discuss your concerns.      Please return here or go to the Emergency Department for any concerns or worsening condition.   You can also call [phone] to schedule an appointment with the appropriate provider.    Please return here or go to the Emergency Department for any concerns or worsening of condition.    Thank you for choosing [facility]!    Our goal in the Urgent Care is to always provide outstanding medical care. You may receive a survey by mail or e-mail in the next week regarding your experience today. We would greatly appreciate you completing and returning the survey. Your feedback provides us with a way to recognize our staff who provide very good care, and it helps us learn how to improve when your experience was below our aspiration of excellence.      We appreciate you trusting us with your medical care. We hope you feel better soon. We will be happy to take care of you for all of your future medical needs.    Sincerely,    KEON Lanza

## 2024-02-01 NOTE — PROGRESS NOTES
"Subjective:      Patient ID: Kaylee Lefort is a 33 y.o. female.    Vitals:  height is 5' 5" (1.651 m) and weight is 72.6 kg (160 lb). Her oral temperature is 98.6 °F (37 °C). Her blood pressure is 150/97 (abnormal) and her pulse is 89. Her respiration is 18 and oxygen saturation is 98%.     Chief Complaint: Sore Throat (/)    Pt is being seen for Sore throat, chills and vomiting.  Symptoms started Monday with no treatments, symptoms started are unchanged.   Provider note begins below:  Pt denies any pmh she says she had emesis x 1 Monday and started with sore throat yesterday and has gotten worse. She has tolerated PO. Pt is a .     Sore Throat   This is a new problem. The current episode started in the past 7 days. The problem has been unchanged. There has been no fever. The pain is at a severity of 10/10. The pain is severe. Associated symptoms include vomiting (resolved). Pertinent negatives include no abdominal pain, congestion, coughing, diarrhea, drooling, ear discharge, ear pain, headaches, hoarse voice, plugged ear sensation, neck pain, shortness of breath, stridor, swollen glands or trouble swallowing. She has had no exposure to strep or mono. She has tried acetaminophen (dayquil) for the symptoms. The treatment provided no relief.       Constitution: Positive for chills. Negative for activity change, appetite change, sweating, fatigue, fever and unexpected weight change.   HENT:  Positive for sore throat. Negative for ear pain, ear discharge, drooling, congestion, sinus pain, sinus pressure, trouble swallowing and voice change.    Neck: Negative for neck pain.   Respiratory:  Negative for cough, shortness of breath and stridor.    Gastrointestinal:  Positive for vomiting (resolved). Negative for abdominal pain and diarrhea.   Neurological:  Negative for headaches.      Objective:     Physical Exam   Constitutional: She is oriented to person, place, and time. She appears well-developed.  " Non-toxic appearance. She does not appear ill. No distress.   HENT:   Head: Normocephalic and atraumatic.   Ears:   Right Ear: External ear normal.   Left Ear: External ear normal.   Nose: Nose normal.   Mouth/Throat: Uvula is midline and mucous membranes are normal. No trismus in the jaw. No uvula swelling. Posterior oropharyngeal erythema present. No oropharyngeal exudate, posterior oropharyngeal edema, tonsillar abscesses or cobblestoning. No tonsillar exudate.   Eyes: Conjunctivae, EOM and lids are normal. Pupils are equal, round, and reactive to light.   Neck: Trachea normal and phonation normal. Neck supple. No Brudzinski's sign and no Kernig's sign noted.   Musculoskeletal: Normal range of motion.         General: Normal range of motion.   Lymphadenopathy:     She has cervical adenopathy.   Neurological: She is alert and oriented to person, place, and time.   Skin: Skin is warm, dry, intact and not diaphoretic.   Psychiatric: Her speech is normal and behavior is normal. Judgment and thought content normal.   Nursing note and vitals reviewed.      Assessment:     1. Viral pharyngitis    2. Sore throat      Results for orders placed or performed in visit on 01/31/24   POCT Influenza A/B MOLECULAR   Result Value Ref Range    POC Molecular Influenza A Ag Negative Negative, Not Reported    POC Molecular Influenza B Ag Negative Negative, Not Reported     Acceptable Yes    POCT Strep A, Molecular   Result Value Ref Range    Molecular Strep A, POC Negative Negative     Acceptable Yes       Plan:     Flu and strep neg  Advised on salt water gargles, throat lozenges, tea with honey, alternate tylenol and ibu for ha/body aches    Discussed results/diagnosis/plan with patient in clinic. Strict precautions given to patient to monitor for worsening signs and symptoms. Advised to follow up with PCP or specialist.    Explained side effects of medications prescribed with patient and informed  him/her to discontinue use if he/she has any side effects and to inform UC or PCP if this occurs. All questions answered. Strict ED verses clinic return precautions stressed and given in depth. Advised if symptoms worsens of fail to improve he/she should go to the Emergency Room. Discharge and follow-up instructions given verbally/printed with the patient who expressed understanding and willingness to comply with my recommendations. Patient voiced understanding and in agreement with current treatment plan. Patient exits the exam room in no acute distress. Conversant and engaged during discharge discussion, verbalized understanding.      Viral pharyngitis    Sore throat  -     POCT Influenza A/B MOLECULAR  -     POCT Strep A, Molecular                Patient Instructions   General Discharge Instructions   PLEASE READ YOUR DISCHARGE INSTRUCTIONS ENTIRELY AS IT CONTAINS IMPORTANT INFORMATION.  If you were prescribed a narcotic or controlled medication, do not drive or operate heavy equipment or machinery while taking these medications.  If you were prescribed antibiotics, please take them to completion.  You must understand that you've received an Urgent Care treatment only and that you may be released before all your medical problems are known or treated. You, the patient, will arrange for follow up care as instructed.    OVER THE COUNTER RECOMMENDATIONS/SUGGESTIONS.    Make sure to stay well hydrated.    Use Nasal Saline to mechanically move any post nasal drip from your eustachian tube or from the back of your throat.    Use warm salt water gargles to ease your throat pain. Warm salt water gargles as needed for sore throat- 1/2 tsp salt to 1 cup warm water, gargle as desired.    Use an antihistamine such as Claritin, Zyrtec or Allegra to dry you out.    Use pseudoephedrine (behind the counter) to decongest. Pseudoephedrine 30 mg up to 240 mg /day. It can raise your blood pressure and give you palpitations.    Use  mucinex (guaifenesin) to break up mucous up to 2400mg/day to loosen any mucous.    The mucinex DM pill has a cough suppressant that can be sedating. It can be used at night to stop the tickle at the back of your throat.    You can use Mucinex D (it has guaifenesin and a high dose of pseudoephedrine) in the mornings to help decongest.    Use Afrin in each nare for no longer than 3 days, as it is addictive. It can also dry out your mucous membranes and cause elevated blood pressure. This is especially useful if you are flying.    Use Flonase 1-2 sprays/nostril per day. It is a local acting steroid nasal spray, if you develop a bloody nose, stop using the medication immediately.    Sometimes Nyquil at night is beneficial to help you get some rest, however it is sedating and it does have an antihistamine, and tylenol.    Honey is a natural cough suppressant that can be used.    Tylenol up to 4,000 mg a day is safe for short periods and can be used for body aches, pain, and fever. However in high doses and prolonged use it can cause liver irritation.    Ibuprofen is a non-steroidal anti-inflammatory that can be used for body aches, pain, and fever.However it can also cause stomach irritation if over used.     Follow up with your PCP or specialty clinic as instructed in the next 2-3 days if not improved or as needed. You can call [phone] to schedule an appointment with appropriate provider.      If you condition worsens, we recommend that you receive another evaluation at the emergency room immediately or contact your primary medical clinic's after hours call service to discuss your concerns.      Please return here or go to the Emergency Department for any concerns or worsening condition.   You can also call [phone] to schedule an appointment with the appropriate provider.    Please return here or go to the Emergency Department for any concerns or worsening of condition.    Thank you for choosing [facility]!    Our goal in the Urgent Care is to always provide outstanding medical care. You may receive a survey by mail or e-mail in the next week regarding your experience today. We would greatly appreciate you completing and returning the survey. Your feedback provides us with a way to recognize our staff who provide very good care, and it helps us learn how to improve when your experience was below our aspiration of excellence.      We appreciate you trusting us with your medical care. We hope you feel better soon. We will be happy to take care of you for all of your future medical needs.    Sincerely,    KEON Lanza

## 2024-11-05 ENCOUNTER — OFFICE VISIT (OUTPATIENT)
Dept: URGENT CARE | Facility: CLINIC | Age: 34
End: 2024-11-05
Payer: COMMERCIAL

## 2024-11-05 VITALS
RESPIRATION RATE: 18 BRPM | HEIGHT: 65 IN | DIASTOLIC BLOOD PRESSURE: 85 MMHG | OXYGEN SATURATION: 97 % | TEMPERATURE: 100 F | HEART RATE: 86 BPM | WEIGHT: 165 LBS | BODY MASS INDEX: 27.49 KG/M2 | SYSTOLIC BLOOD PRESSURE: 131 MMHG

## 2024-11-05 DIAGNOSIS — J02.9 ACUTE VIRAL PHARYNGITIS: ICD-10-CM

## 2024-11-05 DIAGNOSIS — J02.9 SORE THROAT: Primary | ICD-10-CM

## 2024-11-05 LAB
CTP QC/QA: YES
MOLECULAR STREP A: NEGATIVE

## 2024-11-05 PROCEDURE — 99213 OFFICE O/P EST LOW 20 MIN: CPT | Mod: S$GLB,,,

## 2024-11-05 PROCEDURE — 87651 STREP A DNA AMP PROBE: CPT | Mod: QW,S$GLB,,

## 2024-11-05 NOTE — PROGRESS NOTES
Subjective:      Patient ID: Kaylee Lefort is a 33 y.o. female.    Vitals:  height is 5' (1.524 m) and weight is 74.8 kg (165 lb). Her oral temperature is 99.6 °F (37.6 °C). Her blood pressure is 131/85 and her pulse is 86. Her respiration is 18 and oxygen saturation is 97%.     Chief Complaint: Sore Throat    Pt is here for sore throat that onset yesterday. Pt states pain is 9/10. she has tried tylenol, no relief,. Her son just tested positive for strep and wanted to make sure she wasn't getting it. Denies any nasal congestion, ear pain or cough.     Sore Throat   This is a new problem. The current episode started yesterday. The problem has been gradually worsening. There has been no fever. The pain is at a severity of 9/10. The pain is mild. Associated symptoms include headaches and trouble swallowing. She has tried acetaminophen for the symptoms. The treatment provided no relief.       HENT:  Positive for sore throat and trouble swallowing.    Neurological:  Positive for headaches.      Objective:     Physical Exam   Constitutional: She is oriented to person, place, and time. She appears well-developed. She is cooperative.  Non-toxic appearance. She does not appear ill. No distress.      Comments:Patient sits comfortably in exam chair. Answers questions in complete sentences. Does not show any signs of distress or discoloration.        HENT:   Head: Normocephalic and atraumatic.   Ears:   Right Ear: Hearing, tympanic membrane, external ear and ear canal normal. no impacted cerumen  Left Ear: Hearing, tympanic membrane, external ear and ear canal normal. no impacted cerumen  Nose: Nose normal. No mucosal edema, rhinorrhea, nasal deformity or congestion. No epistaxis. Right sinus exhibits no maxillary sinus tenderness and no frontal sinus tenderness. Left sinus exhibits no maxillary sinus tenderness and no frontal sinus tenderness.   Mouth/Throat: Uvula is midline and mucous membranes are normal. No trismus in the  jaw. Normal dentition. No uvula swelling. Posterior oropharyngeal erythema present. No oropharyngeal exudate or posterior oropharyngeal edema.   Eyes: Conjunctivae and lids are normal. No scleral icterus.   Neck: Trachea normal and phonation normal. Neck supple. No edema present. No erythema present. No neck rigidity present.   Cardiovascular: Normal rate, regular rhythm, normal heart sounds and normal pulses.   No murmur heard.Exam reveals no gallop and no friction rub.   Pulmonary/Chest: Effort normal and breath sounds normal. No stridor. No respiratory distress. She has no decreased breath sounds. She has no wheezes. She has no rhonchi. She has no rales. She exhibits no tenderness.   Abdominal: Normal appearance.   Musculoskeletal: Normal range of motion.         General: No deformity. Normal range of motion.   Lymphadenopathy:     She has no cervical adenopathy.        Right cervical: No superficial cervical, no deep cervical and no posterior cervical adenopathy present.       Left cervical: No superficial cervical, no deep cervical and no posterior cervical adenopathy present.   Neurological: She is alert and oriented to person, place, and time. She exhibits normal muscle tone. Coordination normal.   Skin: Skin is warm, dry, intact, not diaphoretic and not pale.   Psychiatric: Her speech is normal and behavior is normal. Judgment and thought content normal.   Nursing note and vitals reviewed.    Results for orders placed or performed in visit on 11/05/24   POCT Strep A, Molecular    Collection Time: 11/05/24  3:46 PM   Result Value Ref Range    Molecular Strep A, POC Negative Negative     Acceptable Yes          Assessment:     1. Sore throat    2. Acute viral pharyngitis        Plan:       Sore throat  -     POCT Strep A, Molecular    Acute viral pharyngitis                Patient Instructions   - Rest.    - Drink plenty of fluids. Increasing your fluid intake will help loosen up mucous.  -  Viral upper respiratory infections typically run their course in 10-14 days.      - Chloraseptic throat spray can help numb the throat.     - Warm salt water gargles can help with sore throat.  - Warm tea with honey can help with sore throat and cough. Honey is a natural cough suppressant.     - Acetaminophen (tylenol) or Ibuprofen (advil,motrin) as directed as needed for fever/pain. Avoid tylenol if you have a history of liver disease. Do not take ibuprofen if you have a history of GI bleeding, kidney disease, or if you take blood thinners.   - Ibuprofen dosing for adults: 400 mg by mouth every 4-6 hours as needed. Max: 2400 mg/day; Info: use lowest effective dose, shortest effective treatment duration; give w/ food if GI upset occurs.  - Tylenol dosing for adults: [By mouth route, immediate-release form] Dose: 325-1000 mg by mouth every 4-6h as needed; Max: 1 g/4h and 4 g/day from all sources. [By mouth route, extended-release form] Dose: 650-1300 mg Extended Release by mouth every 8h as needed; Max: 4 g/day from all sources.     - You must understand that you have received an Urgent Care treatment only and that you may be released before all of your medical problems are known or treated.   - You, the patient, will arrange for follow up care as instructed.   - If your condition worsens or fails to improve we recommend that you receive another evaluation at the ER immediately or contact your PCP to discuss your concerns or return here.   - Follow up with your PCP or specialty clinic as directed in the next 1-2 weeks if not improved or as needed.  You can call [phone] to schedule an appointment with the appropriate provider.    If your symptoms do not improve or worsen, go to the emergency room immediately.

## 2024-11-05 NOTE — PATIENT INSTRUCTIONS
- Rest.    - Drink plenty of fluids. Increasing your fluid intake will help loosen up mucous.  - Viral upper respiratory infections typically run their course in 10-14 days.      - Chloraseptic throat spray can help numb the throat.     - Warm salt water gargles can help with sore throat.  - Warm tea with honey can help with sore throat and cough. Honey is a natural cough suppressant.     - Acetaminophen (tylenol) or Ibuprofen (advil,motrin) as directed as needed for fever/pain. Avoid tylenol if you have a history of liver disease. Do not take ibuprofen if you have a history of GI bleeding, kidney disease, or if you take blood thinners.   - Ibuprofen dosing for adults: 400 mg by mouth every 4-6 hours as needed. Max: 2400 mg/day; Info: use lowest effective dose, shortest effective treatment duration; give w/ food if GI upset occurs.  - Tylenol dosing for adults: [By mouth route, immediate-release form] Dose: 325-1000 mg by mouth every 4-6h as needed; Max: 1 g/4h and 4 g/day from all sources. [By mouth route, extended-release form] Dose: 650-1300 mg Extended Release by mouth every 8h as needed; Max: 4 g/day from all sources.     - You must understand that you have received an Urgent Care treatment only and that you may be released before all of your medical problems are known or treated.   - You, the patient, will arrange for follow up care as instructed.   - If your condition worsens or fails to improve we recommend that you receive another evaluation at the ER immediately or contact your PCP to discuss your concerns or return here.   - Follow up with your PCP or specialty clinic as directed in the next 1-2 weeks if not improved or as needed.  You can call [phone] to schedule an appointment with the appropriate provider.    If your symptoms do not improve or worsen, go to the emergency room immediately.

## 2025-02-12 ENCOUNTER — OFFICE VISIT (OUTPATIENT)
Dept: UROLOGY | Facility: CLINIC | Age: 35
End: 2025-02-12
Payer: COMMERCIAL

## 2025-02-12 DIAGNOSIS — N22 CALCULUS OF URINARY TRACT IN DISEASES CLASSIFIED ELSEWHERE: Primary | ICD-10-CM

## 2025-02-12 PROBLEM — R11.10 VOMITING: Status: RESOLVED | Noted: 2018-04-27 | Resolved: 2025-02-12

## 2025-02-12 PROCEDURE — 99999 PR PBB SHADOW E&M-EST. PATIENT-LVL II: CPT | Mod: PBBFAC,,, | Performed by: STUDENT IN AN ORGANIZED HEALTH CARE EDUCATION/TRAINING PROGRAM

## 2025-02-12 NOTE — PROGRESS NOTES
Patient ID: Kaylee Lefort is a 34 y.o. female.    Chief Complaint: urolithiasis  Referral: Self, Aaareferral  No address on file     [facility]  34 y.o. who presents to the Urology clinic for evaluation of urolithiasis, bilateral. Patient noted to have stones in 2019, she never passed stones or had treatment. Notes her symptoms of flank pain and hematuria resolved about 1 week ago. Not associated with N/V fevers or chills. Accompanied by mother. She drinks 40 oz of water daily. Previously taking emergencee daily, wonders if that may be contributing to her stone risk.       Medically Necessary ROS documented in HPI    Past Medical History  Active Ambulatory Problems     Diagnosis Date Noted    Vomiting 04/27/2018     Resolved Ambulatory Problems     Diagnosis Date Noted    No Resolved Ambulatory Problems     Past Medical History:   Diagnosis Date    Hypertension     Thyroid disease          Past Surgical History  Past Surgical History:   Procedure Laterality Date    ABDOMINAL SURGERY      BACK SURGERY      BUNIONECTOMY      TONSILLECTOMY         Social History       Medications    Current Outpatient Medications:     amlodipine (NORVASC) 5 MG tablet, Take 5 mg by mouth once daily., Disp: , Rfl:     levothyroxine (SYNTHROID) 100 MCG tablet, Take 100 mcg by mouth once daily., Disp: , Rfl:     diphenhydrAMINE (BENADRYL) 25 mg capsule, Take 1 each (25 mg total) by mouth every 6 (six) hours as needed for Itching or Allergies. (Patient not taking: Reported on 11/5/2024), Disp: 20 capsule, Rfl: 0    L norgest/e.estradiol-e.estrad (AMETHIA) 0.15 mg-30 mcg (84)/10 mcg (7) 3MPk, Take by mouth., Disp: , Rfl:     loratadine (CLARITIN) 10 mg tablet, Take 1 tablet (10 mg total) by mouth once daily., Disp: 60 tablet, Rfl: 0    losartan (COZAAR) 25 MG tablet, Take 25 mg by mouth once daily. (Patient not taking: Reported on 11/5/2024), Disp: , Rfl:     naproxen (EC-NAPROSYN) 375 MG TbEC EC tablet, Take 375 mg by mouth 2 (two) times daily.  (Patient not taking: Reported on 11/5/2024), Disp: , Rfl:     ondansetron (ZOFRAN) 4 MG tablet, Take 1 tablet (4 mg total) by mouth every 6 (six) hours as needed for Nausea (for nausea). (Patient not taking: Reported on 11/5/2024), Disp: 12 tablet, Rfl: 0    Allergies  Review of patient's allergies indicates:   Allergen Reactions    Clindamycin Rash       Patient's PMH, FH, Social hx, Medications, allergies reviewed and updated as pertinent to today's visit    Objective:      Physical Exam  Constitutional:       Appearance: She is well-developed.   HENT:      Head: Normocephalic and atraumatic.   Eyes:      Conjunctiva/sclera: Conjunctivae normal.   Pulmonary:      Effort: Pulmonary effort is normal. No respiratory distress.   Abdominal:      General: Abdomen is flat. There is no distension.      Palpations: Abdomen is soft. There is no mass.      Tenderness: There is no abdominal tenderness. There is no right CVA tenderness or guarding.   Skin:     General: Skin is warm.      Findings: No rash.   Neurological:      Mental Status: She is alert and oriented to person, place, and time.   Psychiatric:         Behavior: Behavior normal.           Imaging results:   Impression      Bilateral nonobstructing urinary calculi, including a larger 9 mm calculus at the interpolar region left kidney.    Electronically Signed By: Kenn Figueredo, 1/29/2025 12:41 CST  Narrative    Date of service: 1/29/2025 8:31 CST  Exam description: [facility] [place] US KIDNEYS AND BLADDER.  Technique: Multiple transabdominal grayscale images of the abdomen were obtained, including color flow and spectral Doppler interrogation.    Clinical history: 34 years-old Female with BLOOD IN URINE.    Comparison: None.      Findings:    The right kidney is normal in size and exhibits normal echogenicity. 5 mm urinary calculus at the interpolar region right kidney as well as a 5 mm calculus at the inferior pole. No evidence of hydronephrosis.  The right kidney measures  10.5 x 5.0 x 5.6 cm.    Left kidney: The left kidney is normal in size and exhibits normal echogenicity. 7 mm nonobstructing urinary calculus at the superior pole, as well as a larger 9 mm nodular urinary calculus at the interpolar region of the left kidney. 5 mm nonobstructing urinary calculus is identified at the inferior pole left kidney.. Simple appearing left renal cyst.  The left kidney measures 10.5 x 4.8 x 5.4 cm.    Limited views of the bladder demonstrate no abnormalities.      Doppler findings:  Satisfactory perfusion of both kidneys on color flow interrogation.  Exam End: 01/29/25 09:24    Specimen Collected: 01/29/25 12:40 Last Resulted: 01/29/25 12:41   Received From: [facility]  Result Received: 02/11/25 09:57     Assessment:       1. Calculus of urinary tract in diseases classified elsewhere        Plan:         Discussed RBUS report is available for review, unable to review RBUS imaging personally  Due to gross hematuria recommend CT scan  Presentation is most consistent with stone especially after reviewing CT from 2020 w/ evidence of RLP stone, punctate.   Discussed options for management: observation, ESWL ( will need KUB), ureteroscopic stone removal ( invasive and more morbid compared to ESWL)   Discussed stone prevention strategies  Discussed alarm symptoms that would prompt urgency follow up for management should observation be elected

## 2025-02-17 ENCOUNTER — RESULTS FOLLOW-UP (OUTPATIENT)
Dept: UROLOGY | Facility: HOSPITAL | Age: 35
End: 2025-02-17

## 2025-02-17 ENCOUNTER — HOSPITAL ENCOUNTER (OUTPATIENT)
Dept: RADIOLOGY | Facility: HOSPITAL | Age: 35
Discharge: HOME | End: 2025-02-17
Attending: STUDENT IN AN ORGANIZED HEALTH CARE EDUCATION/TRAINING PROGRAM
Payer: COMMERCIAL

## 2025-02-17 DIAGNOSIS — N22 CALCULUS OF URINARY TRACT IN DISEASES CLASSIFIED ELSEWHERE: ICD-10-CM

## 2025-02-17 PROCEDURE — 74176 CT ABD & PELVIS W/O CONTRAST: CPT | Mod: TC
